# Patient Record
Sex: FEMALE | Race: WHITE | Employment: OTHER | URBAN - METROPOLITAN AREA
[De-identification: names, ages, dates, MRNs, and addresses within clinical notes are randomized per-mention and may not be internally consistent; named-entity substitution may affect disease eponyms.]

---

## 2017-01-09 ENCOUNTER — TELEPHONE (OUTPATIENT)
Dept: FAMILY MEDICINE CLINIC | Age: 57
End: 2017-01-09

## 2017-02-15 ENCOUNTER — OFFICE VISIT (OUTPATIENT)
Dept: ORTHOPEDIC SURGERY | Age: 57
End: 2017-02-15

## 2017-02-15 VITALS
DIASTOLIC BLOOD PRESSURE: 84 MMHG | BODY MASS INDEX: 30.64 KG/M2 | HEIGHT: 64 IN | HEART RATE: 73 BPM | WEIGHT: 179.45 LBS | SYSTOLIC BLOOD PRESSURE: 134 MMHG

## 2017-02-15 DIAGNOSIS — M67.472 GANGLION CYST OF LEFT FOOT: Primary | ICD-10-CM

## 2017-02-15 PROCEDURE — 99212 OFFICE O/P EST SF 10 MIN: CPT | Performed by: ORTHOPAEDIC SURGERY

## 2017-04-18 ENCOUNTER — OFFICE VISIT (OUTPATIENT)
Dept: FAMILY MEDICINE CLINIC | Age: 57
End: 2017-04-18

## 2017-04-18 VITALS
BODY MASS INDEX: 31.76 KG/M2 | HEIGHT: 64 IN | WEIGHT: 186 LBS | SYSTOLIC BLOOD PRESSURE: 110 MMHG | DIASTOLIC BLOOD PRESSURE: 64 MMHG

## 2017-04-18 DIAGNOSIS — Z01.818 PREOP EXAMINATION: ICD-10-CM

## 2017-04-18 DIAGNOSIS — M67.472 GANGLION CYST OF LEFT FOOT: Primary | ICD-10-CM

## 2017-04-18 LAB
ANION GAP SERPL CALCULATED.3IONS-SCNC: 13 MMOL/L (ref 3–16)
BUN BLDV-MCNC: 14 MG/DL (ref 7–20)
CALCIUM SERPL-MCNC: 8.8 MG/DL (ref 8.3–10.6)
CHLORIDE BLD-SCNC: 101 MMOL/L (ref 99–110)
CO2: 26 MMOL/L (ref 21–32)
CREAT SERPL-MCNC: 0.6 MG/DL (ref 0.6–1.1)
GFR AFRICAN AMERICAN: >60
GFR NON-AFRICAN AMERICAN: >60
GLUCOSE BLD-MCNC: 93 MG/DL (ref 70–99)
POTASSIUM SERPL-SCNC: 4.1 MMOL/L (ref 3.5–5.1)
SODIUM BLD-SCNC: 140 MMOL/L (ref 136–145)

## 2017-04-18 PROCEDURE — 36415 COLL VENOUS BLD VENIPUNCTURE: CPT | Performed by: PHYSICIAN ASSISTANT

## 2017-04-18 PROCEDURE — 99242 OFF/OP CONSLTJ NEW/EST SF 20: CPT | Performed by: PHYSICIAN ASSISTANT

## 2017-04-20 RX ORDER — SODIUM CHLORIDE, SODIUM LACTATE, POTASSIUM CHLORIDE, CALCIUM CHLORIDE 600; 310; 30; 20 MG/100ML; MG/100ML; MG/100ML; MG/100ML
INJECTION, SOLUTION INTRAVENOUS CONTINUOUS
Status: CANCELLED | OUTPATIENT
Start: 2017-04-20

## 2017-04-20 RX ORDER — LIDOCAINE HYDROCHLORIDE 10 MG/ML
1 INJECTION, SOLUTION EPIDURAL; INFILTRATION; INTRACAUDAL; PERINEURAL
Status: CANCELLED | OUTPATIENT
Start: 2017-04-20 | End: 2017-04-20

## 2017-04-23 RX ORDER — MEPERIDINE HYDROCHLORIDE 25 MG/ML
12.5 INJECTION INTRAMUSCULAR; INTRAVENOUS; SUBCUTANEOUS EVERY 5 MIN PRN
Status: DISCONTINUED | OUTPATIENT
Start: 2017-04-23 | End: 2017-04-25 | Stop reason: HOSPADM

## 2017-04-23 RX ORDER — OXYCODONE HYDROCHLORIDE AND ACETAMINOPHEN 5; 325 MG/1; MG/1
1 TABLET ORAL PRN
Status: ACTIVE | OUTPATIENT
Start: 2017-04-23 | End: 2017-04-23

## 2017-04-23 RX ORDER — PROMETHAZINE HYDROCHLORIDE 25 MG/ML
6.25 INJECTION, SOLUTION INTRAMUSCULAR; INTRAVENOUS
Status: ACTIVE | OUTPATIENT
Start: 2017-04-23 | End: 2017-04-23

## 2017-04-23 RX ORDER — DIPHENHYDRAMINE HYDROCHLORIDE 50 MG/ML
12.5 INJECTION INTRAMUSCULAR; INTRAVENOUS
Status: ACTIVE | OUTPATIENT
Start: 2017-04-23 | End: 2017-04-23

## 2017-04-23 RX ORDER — OXYCODONE HYDROCHLORIDE AND ACETAMINOPHEN 5; 325 MG/1; MG/1
2 TABLET ORAL PRN
Status: ACTIVE | OUTPATIENT
Start: 2017-04-23 | End: 2017-04-23

## 2017-04-23 RX ORDER — MORPHINE SULFATE 2 MG/ML
1 INJECTION, SOLUTION INTRAMUSCULAR; INTRAVENOUS EVERY 5 MIN PRN
Status: DISCONTINUED | OUTPATIENT
Start: 2017-04-23 | End: 2017-04-25 | Stop reason: HOSPADM

## 2017-04-23 RX ORDER — MORPHINE SULFATE 2 MG/ML
2 INJECTION, SOLUTION INTRAMUSCULAR; INTRAVENOUS EVERY 5 MIN PRN
Status: DISCONTINUED | OUTPATIENT
Start: 2017-04-23 | End: 2017-04-25 | Stop reason: HOSPADM

## 2017-04-23 RX ORDER — HYDRALAZINE HYDROCHLORIDE 20 MG/ML
5 INJECTION INTRAMUSCULAR; INTRAVENOUS EVERY 10 MIN PRN
Status: DISCONTINUED | OUTPATIENT
Start: 2017-04-23 | End: 2017-04-25 | Stop reason: HOSPADM

## 2017-04-23 RX ORDER — LABETALOL HYDROCHLORIDE 5 MG/ML
5 INJECTION, SOLUTION INTRAVENOUS EVERY 10 MIN PRN
Status: DISCONTINUED | OUTPATIENT
Start: 2017-04-23 | End: 2017-04-25 | Stop reason: HOSPADM

## 2017-04-23 RX ORDER — ONDANSETRON 2 MG/ML
4 INJECTION INTRAMUSCULAR; INTRAVENOUS
Status: ACTIVE | OUTPATIENT
Start: 2017-04-23 | End: 2017-04-23

## 2017-04-24 ENCOUNTER — HOSPITAL ENCOUNTER (OUTPATIENT)
Dept: SURGERY | Age: 57
Discharge: HOME | End: 2017-04-24
Attending: ORTHOPAEDIC SURGERY | Admitting: ORTHOPAEDIC SURGERY

## 2017-12-04 ENCOUNTER — OFFICE VISIT (OUTPATIENT)
Dept: INTERNAL MEDICINE CLINIC | Age: 57
End: 2017-12-04

## 2017-12-04 VITALS
HEIGHT: 65 IN | DIASTOLIC BLOOD PRESSURE: 86 MMHG | WEIGHT: 186 LBS | RESPIRATION RATE: 18 BRPM | HEART RATE: 82 BPM | BODY MASS INDEX: 30.99 KG/M2 | SYSTOLIC BLOOD PRESSURE: 128 MMHG

## 2017-12-04 DIAGNOSIS — Z00.00 ANNUAL PHYSICAL EXAM: Primary | ICD-10-CM

## 2017-12-04 PROCEDURE — 99396 PREV VISIT EST AGE 40-64: CPT | Performed by: INTERNAL MEDICINE

## 2017-12-04 NOTE — PROGRESS NOTES
[facility]  History and Physical  Alok Ivey M.D. Home Hall  YOB: 1960    Date of Service:  12/4/2017    Chief Complaint:   Home Hall is a 65 y.o. female who presents for   Chief Complaint   Patient presents with   [facility] Established New Doctor       HPI: Here for Annual Physical and Follow up.     Atrophic vaginitis: stable on estrogen/DHEA vaginal cream per hormone specialist.    No results found for: LABA1C, LABMICR  Lab Results   Component Value Date     04/18/2017    K 4.1 04/18/2017     04/18/2017    CO2 26 04/18/2017    BUN 14 04/18/2017    CREATININE 0.6 04/18/2017    GLUCOSE 93 04/18/2017    CALCIUM 8.8 04/18/2017     Lab Results   Component Value Date    CHOL 204 10/25/2016    TRIG 68 10/25/2016    HDL 69 10/25/2016    HDL 65 10/13/2011    LDLCALC 157 01/12/2016     Lab Results   Component Value Date    ALT 17 10/25/2016    AST 19 10/25/2016     Lab Results   Component Value Date    TSH 2.33 01/12/2016    TSH 3.25 10/05/2010     Lab Results   Component Value Date    WBC 5.0 10/25/2016    HGB 13.6 10/25/2016    HCT 40.8 10/25/2016    MCV 95.6 10/25/2016     10/25/2016     No results found for: INR   No results found for: PSA   No results found for: LABURIC     Wt Readings from Last 3 Encounters:   12/04/17 186 lb (84.4 kg)   11/02/17 187 lb 6.4 oz (85 kg)   04/21/17 186 lb (84.4 kg)     BP Readings from Last 3 Encounters:   12/04/17 128/86   11/02/17 122/86   04/18/17 110/64       Patient Active Problem List   Diagnosis    Ganglion cyst of left foot       No Known Allergies  Outpatient Prescriptions Marked as Taking for the 12/4/17 encounter (Office Visit) with Robert Gaffney MD   Medication Sig Dispense Refill    Emollient (DHEA) 1 % CREA Apply topically      Progesterone Micronized (PROGESTERONE PO) Take 200 mg by mouth      estradiol (ESTRACE) 0.1 MG/GM vaginal cream Place 5 g vaginally daily      NONFORMULARY DHEA cream 20 mg/ml Mouth/Throat: Oropharynx is clear and moist, and mucous membranes are normal.  There is no cervical adenopathy. Eyes: Conjunctivae and extraocular motions are normal. Pupils are equal, round, and reactive to light. Neck: Supple. No JVD present. Carotid bruit is not present. No mass and no thyromegaly present. Cardiovascular: Normal rate, regular rhythm, normal heart sounds and intact distal pulses. Exam reveals no gallop and no friction rub. No murmur heard. Pulmonary/Chest: Effort normal and breath sounds normal. No respiratory distress. She has no wheezes, rhonchi or rales. Abdominal: Soft, non-tender. Bowel sounds and aorta are normal. She exhibits no organomegaly, mass or bruit. Genitourinary: performed by gynecologist.  Breast exam:  performed by specialist.  Musculoskeletal: Normal range of motion, no synovitis. She exhibits no edema. Neurological: She is alert and oriented to person, place, and time. She has normal reflexes. No cranial nerve deficit. Coordination normal.   Skin: Skin is warm and dry. There is no rash or erythema. No suspicious lesions noted. Psychiatric: She has a normal mood and affect.  Her speech is normal and behavior is normal. Judgment, cognition and memory are normal.       No results found for: LABA1C, LABMICR  Lab Results   Component Value Date     04/18/2017    K 4.1 04/18/2017     04/18/2017    CO2 26 04/18/2017    BUN 14 04/18/2017    CREATININE 0.6 04/18/2017    GLUCOSE 93 04/18/2017    CALCIUM 8.8 04/18/2017     Lab Results   Component Value Date    CHOL 204 10/25/2016    TRIG 68 10/25/2016    HDL 69 10/25/2016    HDL 65 10/13/2011    LDLCALC 157 01/12/2016     Lab Results   Component Value Date    ALT 17 10/25/2016    AST 19 10/25/2016     Lab Results   Component Value Date    TSH 2.33 01/12/2016    TSH 3.25 10/05/2010     Lab Results   Component Value Date    WBC 5.0 10/25/2016    HGB 13.6 10/25/2016    HCT 40.8 10/25/2016    MCV 95.6 10/25/2016  10/25/2016     No results found for: INR   No results found for: PSA   No results found for: [facility]         Preventive Care:  Health Maintenance   Topic Date Due    Hepatitis C screen  1960    HIV screen  08/30/1976    Breast cancer screen  01/11/2018    Cervical cancer screen  10/01/2018    Colon cancer screen colonoscopy  04/07/2021    Lipid screen  10/25/2021    DTaP/Tdap/Td vaccine (2 - Td) 11/26/2022    Flu vaccine  Completed      Hx abnormal PAP: no  Sexual activity: has sex with males   Last eye exam: 2017, normal  Exercise: walks 4 time(s) per week   Seatbelt use: yes       Preventive plan of care for Kylie De Oliveira        12/4/2017           Preventive Measures Status       Recommendations for screening   Colon Cancer Screen   Colonoscopy Test is due 2021   Breast Cancer Screen  Mammogram Repeat yearly   Cervical Cancer Screen   PAP smear:  Test is due per GYN   Osteoporosis Screen   DEXA scan  This test is not clinically indicated   Diabetes Screen  Glucose (mg/dL)   Date Value   04/18/2017 93    Repeat yearly   Cholesterol Screen  Lab Results   Component Value Date    CHOL 204 (H) 10/25/2016    TRIG 68 10/25/2016    HDL 69 10/25/2016    LDLCALC 157 (H) 01/12/2016    Test recommended and ordered   Aspirin for Cardiovascular Prevention   No Not indicated   Weight: Body mass index is 31.43 kg/m².   5' 4.5\" (1.638 m)186 lb (84.4 kg)    Your BMI is 25 or greater, which indicates that you are overweight   Living Will: No   Full Code    Recommended Immunizations    Immunization History   Administered Date(s) Administered    Influenza Virus Vaccine 10/05/2010, 10/13/2011, 11/26/2012, 10/28/2015, 11/09/2017    Influenza, Intradermal, Preservative free 09/24/2013, 12/23/2014    Influenza, Madison Halon, 3 yrs and older, IM, Preservative Free 10/25/2016    Pneumococcal 13-valent Conjugate ([id number]) 10/28/2015    Tdap (Boostrix, Adacel) 11/26/2012        Influenza vaccine:  recommended every fall         Other Recommendations ·   See a dentist every 6 months  · Try to get at least 30 minutes of exercise 3-5 days per week  · Always wear a seat belt when traveling in a car  · Always wear a helmet when riding a bicycle or motorcycle  · When exposed to the sun, use a sunscreen that protects against both UVA and UVB radiation with an SPF of 30 or greater- reapply every 2 to 3 hours or after sweating, drying off with a towel, or swimming  · You need 3084-7485 mg of calcium and 2384-2799 IU of vitamin D per day- it is possible to meet your calcium requirement with diet alone, but a vitamin D supplement is usually necessary                 Assessment/Plan:    Art Rowe was seen today for established new doctor. Diagnoses and all orders for this visit:    Annual physical exam  -     Lipid Panel; Future  -     CBC Auto Differential; Future  -     COMPREHENSIVE METABOLIC PANEL; Future      Return Fasting Physical in 1 year.

## 2018-01-17 ENCOUNTER — HOSPITAL ENCOUNTER (OUTPATIENT)
Dept: GENERAL RADIOLOGY | Age: 58
Discharge: HOME | End: 2018-01-17
Attending: INTERNAL MEDICINE | Admitting: INTERNAL MEDICINE

## 2018-01-17 DIAGNOSIS — Z00.00 ANNUAL PHYSICAL EXAM: ICD-10-CM

## 2018-01-17 LAB
A/G RATIO: 1.7 (ref 1.1–2.2)
ALBUMIN SERPL-MCNC: 4.6 G/DL (ref 3.4–5)
ALP BLD-CCNC: 80 U/L (ref 40–129)
ALT SERPL-CCNC: 16 U/L (ref 10–40)
ANION GAP SERPL CALCULATED.3IONS-SCNC: 16 MMOL/L (ref 3–16)
AST SERPL-CCNC: 17 U/L (ref 15–37)
BASOPHILS ABSOLUTE: 0 K/UL (ref 0–0.2)
BASOPHILS RELATIVE PERCENT: 0.4 %
BILIRUB SERPL-MCNC: 0.7 MG/DL (ref 0–1)
BUN BLDV-MCNC: 16 MG/DL (ref 7–20)
CALCIUM SERPL-MCNC: 9.4 MG/DL (ref 8.3–10.6)
CHLORIDE BLD-SCNC: 104 MMOL/L (ref 99–110)
CHOLESTEROL, TOTAL: 230 MG/DL (ref 0–199)
CO2: 26 MMOL/L (ref 21–32)
CREAT SERPL-MCNC: 0.6 MG/DL (ref 0.6–1.1)
EOSINOPHILS ABSOLUTE: 0.1 K/UL (ref 0–0.6)
EOSINOPHILS RELATIVE PERCENT: 2.6 %
GFR AFRICAN AMERICAN: >60
GFR NON-AFRICAN AMERICAN: >60
GLOBULIN: 2.7 G/DL
GLUCOSE BLD-MCNC: 82 MG/DL (ref 70–99)
HCT VFR BLD CALC: 40.7 % (ref 36–48)
HDLC SERPL-MCNC: 68 MG/DL (ref 40–60)
HEMOGLOBIN: 13.7 G/DL (ref 12–16)
LDL CHOLESTEROL CALCULATED: 143 MG/DL
LYMPHOCYTES ABSOLUTE: 1.7 K/UL (ref 1–5.1)
LYMPHOCYTES RELATIVE PERCENT: 31.6 %
MCH RBC QN AUTO: 31.9 PG (ref 26–34)
MCHC RBC AUTO-ENTMCNC: 33.6 G/DL (ref 31–36)
MCV RBC AUTO: 94.8 FL (ref 80–100)
MONOCYTES ABSOLUTE: 0.4 K/UL (ref 0–1.3)
MONOCYTES RELATIVE PERCENT: 7.7 %
NEUTROPHILS ABSOLUTE: 3.1 K/UL (ref 1.7–7.7)
NEUTROPHILS RELATIVE PERCENT: 57.7 %
PDW BLD-RTO: 13.5 % (ref 12.4–15.4)
PLATELET # BLD: 257 K/UL (ref 135–450)
PMV BLD AUTO: 9.6 FL (ref 5–10.5)
POTASSIUM SERPL-SCNC: 4.2 MMOL/L (ref 3.5–5.1)
RBC # BLD: 4.3 M/UL (ref 4–5.2)
SODIUM BLD-SCNC: 146 MMOL/L (ref 136–145)
TOTAL PROTEIN: 7.3 G/DL (ref 6.4–8.2)
TRIGL SERPL-MCNC: 94 MG/DL (ref 0–150)
VLDLC SERPL CALC-MCNC: 19 MG/DL
WBC # BLD: 5.5 K/UL (ref 4–11)

## 2018-01-18 ENCOUNTER — TELEPHONE (OUTPATIENT)
Dept: INTERNAL MEDICINE CLINIC | Age: 58
End: 2018-01-18

## 2018-01-18 NOTE — TELEPHONE ENCOUNTER
Patient called, states her previous PCP Dr. Maurisio High wrote a letter for her last year on 1/9/17 stating that it would be beneficial for her to get massages for medical reasons due to her chronic intermittent trapezius strain and she wanted to know if  would also be willing to do this for her as she is her new PCP by writing her a new letter for this year?  Please advise [phone]

## 2018-02-23 ENCOUNTER — OFFICE VISIT (OUTPATIENT)
Dept: ORTHOPEDIC SURGERY | Age: 58
End: 2018-02-23

## 2018-02-23 DIAGNOSIS — M67.472 GANGLION CYST OF LEFT FOOT: Primary | ICD-10-CM

## 2018-02-23 PROCEDURE — 99212 OFFICE O/P EST SF 10 MIN: CPT | Performed by: ORTHOPAEDIC SURGERY

## 2018-02-26 ENCOUNTER — TELEPHONE (OUTPATIENT)
Dept: ORTHOPEDIC SURGERY | Age: 58
End: 2018-02-26

## 2018-02-26 NOTE — TELEPHONE ENCOUNTER
Auth: NPR  Date: 3/1/18  Reference # None  Type of SX: Outpatient  CPT [id number], [id number]   Location: [facility]  SX area: [facility]

## 2018-02-28 ENCOUNTER — OFFICE VISIT (OUTPATIENT)
Dept: INTERNAL MEDICINE CLINIC | Age: 58
End: 2018-02-28

## 2018-02-28 VITALS
BODY MASS INDEX: 31.76 KG/M2 | WEIGHT: 186 LBS | OXYGEN SATURATION: 98 % | HEART RATE: 74 BPM | TEMPERATURE: 98 F | SYSTOLIC BLOOD PRESSURE: 112 MMHG | DIASTOLIC BLOOD PRESSURE: 78 MMHG | RESPIRATION RATE: 16 BRPM | HEIGHT: 64 IN

## 2018-02-28 DIAGNOSIS — M67.472 GANGLION CYST OF LEFT FOOT: ICD-10-CM

## 2018-02-28 DIAGNOSIS — Z01.818 PREOP EXAM FOR INTERNAL MEDICINE: Primary | ICD-10-CM

## 2018-02-28 LAB
ALBUMIN SERPL-MCNC: 4.7 G/DL (ref 3.4–5)
ANION GAP SERPL CALCULATED.3IONS-SCNC: 15 MMOL/L (ref 3–16)
BUN BLDV-MCNC: 14 MG/DL (ref 7–20)
CALCIUM SERPL-MCNC: 9.7 MG/DL (ref 8.3–10.6)
CHLORIDE BLD-SCNC: 98 MMOL/L (ref 99–110)
CO2: 27 MMOL/L (ref 21–32)
CREAT SERPL-MCNC: 0.6 MG/DL (ref 0.6–1.1)
GFR AFRICAN AMERICAN: >60
GFR NON-AFRICAN AMERICAN: >60
GLUCOSE BLD-MCNC: 168 MG/DL (ref 70–99)
PHOSPHORUS: 3.8 MG/DL (ref 2.5–4.9)
POTASSIUM SERPL-SCNC: 3.8 MMOL/L (ref 3.5–5.1)
SODIUM BLD-SCNC: 140 MMOL/L (ref 136–145)

## 2018-02-28 PROCEDURE — 99243 OFF/OP CNSLTJ NEW/EST LOW 30: CPT | Performed by: INTERNAL MEDICINE

## 2018-02-28 PROCEDURE — 36415 COLL VENOUS BLD VENIPUNCTURE: CPT | Performed by: INTERNAL MEDICINE

## 2018-02-28 NOTE — PROGRESS NOTES
PREOPERATIVE CONSULTATION      Pauline Hull  YOB: 1960    Date of Service:  2/28/2018    Vitals:    02/28/18 1344   BP: 112/78   Site: Left Arm   Position: Sitting   Cuff Size: Medium Adult   Pulse: 74   Resp: 16   Temp: 98 °F (36.7 °C)   SpO2: 98%   Weight: 186 lb (84.4 kg)   Height: 5' 4\" (1.626 m)      Wt Readings from Last 2 Encounters:   02/28/18 186 lb (84.4 kg)   02/28/18 186 lb (84.4 kg)     BP Readings from Last 3 Encounters:   02/28/18 112/78   12/04/17 128/86   11/02/17 122/86        Chief Complaint   Patient presents with   Umm Barcenas Pre-op Exam     No Known Allergies  Outpatient Prescriptions Marked as Taking for the 2/28/18 encounter (Office Visit) with Hawa Gaffney MD   Medication Sig Dispense Refill    Emollient (DHEA) 1 % CREA Apply topically      Progesterone Micronized (PROGESTERONE PO) Take 200 mg by mouth      estradiol (ESTRACE) 0.1 MG/GM vaginal cream Place 5 g vaginally daily      NONFORMULARY DHEA cream 20 mg/ml      Cholecalciferol (VITAMIN D3) 2000 UNITS CAPS Take by mouth      Multiple Vitamin (MULTIVITAMIN PO) Take  by mouth. This patient presents to the office today for a preoperative consultation at the request of surgeon, Dr. Donell Thomas, who plans on performing Left foot Ganglion Cyst removal on March 1 at [facility].  The current problem began 2 years ago, and symptoms have been worsening with time.   Conservative therapy: No.    Planned anesthesia: General  Known anesthesia problems: None   Bleeding risk: No recent or remote history of abnormal bleeding  Personal or FH of DVT/PE: No    Patient objection to receiving blood products: No    Patient Active Problem List   Diagnosis    Ganglion cyst of left foot       Past Medical History:   Diagnosis Date    Atrophic vaginitis      Past Surgical History:   Procedure Laterality Date    COLONOSCOPY  4/6/16    polyps     Family History   Problem Relation Age of Onset    Alzheimer's Disease are normal. She exhibits no distension and no mass. There is no hepatosplenomegaly. No tenderness. Musculoskeletal: She exhibits no edema and no tenderness. Neurological: She is alert and oriented to person, place, and time. She has normal strength. No cranial nerve deficit or sensory deficit. Coordination and gait normal.   Skin: Skin is warm and dry. No rash noted. No erythema. Psychiatric: She has a normal mood and affect. Her behavior is normal.       No results found for: LABA1C, LABMICR  Lab Results   Component Value Date     (H) 01/17/2018    K 4.2 01/17/2018     01/17/2018    CO2 26 01/17/2018    BUN 16 01/17/2018    CREATININE 0.6 01/17/2018    GLUCOSE 82 01/17/2018    CALCIUM 9.4 01/17/2018     Lab Results   Component Value Date    CHOL 230 01/17/2018    TRIG 94 01/17/2018    HDL 68 01/17/2018    HDL 65 10/13/2011    LDLCALC 143 01/17/2018     Lab Results   Component Value Date    ALT 16 01/17/2018    AST 17 01/17/2018     Lab Results   Component Value Date    TSH 2.33 01/12/2016    TSH 3.25 10/05/2010     Lab Results   Component Value Date    WBC 5.5 01/17/2018    HGB 13.7 01/17/2018    HCT 40.7 01/17/2018    MCV 94.8 01/17/2018     01/17/2018     No results found for: INR   No results found for: PSA   No results found for: LABURIC          Assessment:       65 y.o. patient with planned surgery as above. SURGERY SPECIFIC RISK:  none  Known risk factors for perioperative complications: None  Current medications which may produce withdrawal symptoms if withheld perioperatively: none      Plan:     1. Preoperative workup as follows: none  2. Change in medication regimen before surgery: None  3.  Prophylaxis for cardiac events with perioperative beta-blockers: Not indicated  ACC/AHA indications for pre-operative beta-blocker use:    · Vascular surgery with history of postitive stress test  · Intermediate or high risk surgery with history of CAD   · Intermediate or high risk surgery

## 2018-03-01 ENCOUNTER — HOSPITAL ENCOUNTER (OUTPATIENT)
Dept: SURGERY | Age: 58
Discharge: HOME | End: 2018-03-01
Attending: ORTHOPAEDIC SURGERY | Admitting: ORTHOPAEDIC SURGERY

## 2018-03-01 VITALS
HEIGHT: 64 IN | TEMPERATURE: 97.4 F | RESPIRATION RATE: 16 BRPM | BODY MASS INDEX: 31.76 KG/M2 | SYSTOLIC BLOOD PRESSURE: 139 MMHG | HEART RATE: 79 BPM | WEIGHT: 186 LBS | OXYGEN SATURATION: 98 % | DIASTOLIC BLOOD PRESSURE: 97 MMHG

## 2018-03-01 DIAGNOSIS — M67.472 GANGLION CYST OF LEFT FOOT: ICD-10-CM

## 2018-03-01 DIAGNOSIS — M21.612 BUNION, LEFT: Primary | ICD-10-CM

## 2018-03-01 RX ORDER — LIDOCAINE HYDROCHLORIDE 10 MG/ML
2 INJECTION, SOLUTION INFILTRATION; PERINEURAL
Status: ACTIVE | OUTPATIENT
Start: 2018-03-01 | End: 2018-03-01

## 2018-03-01 RX ORDER — DIPHENHYDRAMINE HYDROCHLORIDE 50 MG/ML
12.5 INJECTION INTRAMUSCULAR; INTRAVENOUS
Status: ACTIVE | OUTPATIENT
Start: 2018-03-01 | End: 2018-03-01

## 2018-03-01 RX ORDER — MORPHINE SULFATE 2 MG/ML
1 INJECTION, SOLUTION INTRAMUSCULAR; INTRAVENOUS EVERY 5 MIN PRN
Status: DISCONTINUED | OUTPATIENT
Start: 2018-03-01 | End: 2018-03-02 | Stop reason: HOSPADM

## 2018-03-01 RX ORDER — SODIUM CHLORIDE, SODIUM LACTATE, POTASSIUM CHLORIDE, CALCIUM CHLORIDE 600; 310; 30; 20 MG/100ML; MG/100ML; MG/100ML; MG/100ML
INJECTION, SOLUTION INTRAVENOUS CONTINUOUS
Status: DISCONTINUED | OUTPATIENT
Start: 2018-03-01 | End: 2018-03-02 | Stop reason: HOSPADM

## 2018-03-01 RX ORDER — PROMETHAZINE HYDROCHLORIDE 25 MG/ML
6.25 INJECTION, SOLUTION INTRAMUSCULAR; INTRAVENOUS
Status: ACTIVE | OUTPATIENT
Start: 2018-03-01 | End: 2018-03-01

## 2018-03-01 RX ORDER — CEPHALEXIN 500 MG/1
500 CAPSULE ORAL 4 TIMES DAILY
Qty: 8 CAPSULE | Refills: 0 | Status: SHIPPED | OUTPATIENT
Start: 2018-03-01 | End: 2018-03-03

## 2018-03-01 RX ORDER — HYDRALAZINE HYDROCHLORIDE 20 MG/ML
5 INJECTION INTRAMUSCULAR; INTRAVENOUS EVERY 10 MIN PRN
Status: DISCONTINUED | OUTPATIENT
Start: 2018-03-01 | End: 2018-03-02 | Stop reason: HOSPADM

## 2018-03-01 RX ORDER — ONDANSETRON 2 MG/ML
4 INJECTION INTRAMUSCULAR; INTRAVENOUS
Status: ACTIVE | OUTPATIENT
Start: 2018-03-01 | End: 2018-03-01

## 2018-03-01 RX ORDER — MORPHINE SULFATE 2 MG/ML
2 INJECTION, SOLUTION INTRAMUSCULAR; INTRAVENOUS EVERY 5 MIN PRN
Status: DISCONTINUED | OUTPATIENT
Start: 2018-03-01 | End: 2018-03-02 | Stop reason: HOSPADM

## 2018-03-01 RX ORDER — OXYCODONE HYDROCHLORIDE AND ACETAMINOPHEN 5; 325 MG/1; MG/1
2 TABLET ORAL PRN
Status: ACTIVE | OUTPATIENT
Start: 2018-03-01 | End: 2018-03-01

## 2018-03-01 RX ORDER — HYDROCODONE BITARTRATE AND ACETAMINOPHEN 5; 325 MG/1; MG/1
1 TABLET ORAL EVERY 8 HOURS PRN
Qty: 21 TABLET | Refills: 0 | Status: SHIPPED | OUTPATIENT
Start: 2018-03-01 | End: 2018-03-08

## 2018-03-01 RX ORDER — LABETALOL HYDROCHLORIDE 5 MG/ML
5 INJECTION, SOLUTION INTRAVENOUS EVERY 10 MIN PRN
Status: DISCONTINUED | OUTPATIENT
Start: 2018-03-01 | End: 2018-03-02 | Stop reason: HOSPADM

## 2018-03-01 RX ORDER — MEPERIDINE HYDROCHLORIDE 50 MG/ML
12.5 INJECTION INTRAMUSCULAR; INTRAVENOUS; SUBCUTANEOUS EVERY 5 MIN PRN
Status: DISCONTINUED | OUTPATIENT
Start: 2018-03-01 | End: 2018-03-02 | Stop reason: HOSPADM

## 2018-03-01 RX ORDER — OXYCODONE HYDROCHLORIDE AND ACETAMINOPHEN 5; 325 MG/1; MG/1
1 TABLET ORAL PRN
Status: ACTIVE | OUTPATIENT
Start: 2018-03-01 | End: 2018-03-01

## 2018-03-01 RX ADMIN — Medication 0.25 MG: at 15:27

## 2018-03-01 RX ADMIN — Medication 0.25 MG: at 15:15

## 2018-03-01 RX ADMIN — SODIUM CHLORIDE, SODIUM LACTATE, POTASSIUM CHLORIDE, CALCIUM CHLORIDE: 600; 310; 30; 20 INJECTION, SOLUTION INTRAVENOUS at 12:35

## 2018-03-01 ASSESSMENT — PAIN SCALES - GENERAL
PAINLEVEL_OUTOF10: 5
PAINLEVEL_OUTOF10: 5
PAINLEVEL_OUTOF10: 0
PAINLEVEL_OUTOF10: 4
PAINLEVEL_OUTOF10: 4

## 2018-03-01 ASSESSMENT — PAIN - FUNCTIONAL ASSESSMENT: PAIN_FUNCTIONAL_ASSESSMENT: 0-10

## 2018-03-01 NOTE — ANESTHESIA PRE-OP
Surgical History:        Procedure Laterality Date    COLONOSCOPY  4/6/16    polyps       Social History:    Social History   Substance Use Topics    Smoking status: Never Smoker    Smokeless tobacco: Never Used    Alcohol use 1.2 oz/week     2 Glasses of wine per week                                Counseling given: Not Answered      Vital Signs (Current):   Vitals:    03/01/18 1200   BP: 115/85   Pulse: 84   Resp: 16   Temp: 97.6 °F (36.4 °C)   TempSrc: Temporal   SpO2: 94%   Weight: 186 lb (84.4 kg)   Height: 5' 4\" (1.626 m)                                              BP Readings from Last 3 Encounters:   03/01/18 115/85   02/28/18 112/78   12/04/17 128/86       NPO Status: Time of last liquid consumption: 2200                        Time of last solid consumption: 2010                        Date of last liquid consumption: 02/28/18                        Date of last solid food consumption: 02/28/18    BMI:   Wt Readings from Last 3 Encounters:   03/01/18 186 lb (84.4 kg)   02/28/18 186 lb (84.4 kg)   02/28/18 186 lb (84.4 kg)     Body mass index is 31.93 kg/m². Anesthesia Evaluation   no history of anesthetic complications:   Airway: Mallampati: II  TM distance: >3 FB   Neck ROM: full  Mouth opening: > = 3 FB Dental: normal exam         Pulmonary:Negative Pulmonary ROS                              Cardiovascular:Negative CV ROS                      Neuro/Psych:   Negative Neuro/Psych ROS              GI/Hepatic/Renal: Neg GI/Hepatic/Renal ROS            Endo/Other: Negative Endo/Other ROS                    Abdominal:           Vascular: negative vascular ROS. Anesthesia Plan      general     ASA 1     (Risks, benefits and alternatives of GA discussed with pt. Questions answered. Willing to proceed.)  Induction: intravenous. Anesthetic plan and risks discussed with patient.                       Martin Shea MD   3/1/2018

## 2018-03-09 ENCOUNTER — OFFICE VISIT (OUTPATIENT)
Dept: ORTHOPEDIC SURGERY | Age: 58
End: 2018-03-09

## 2018-03-09 VITALS
WEIGHT: 186.07 LBS | HEART RATE: 86 BPM | HEIGHT: 64 IN | DIASTOLIC BLOOD PRESSURE: 87 MMHG | SYSTOLIC BLOOD PRESSURE: 128 MMHG | BODY MASS INDEX: 31.77 KG/M2

## 2018-03-09 DIAGNOSIS — M21.612 BUNION, LEFT: ICD-10-CM

## 2018-03-09 DIAGNOSIS — M67.472 GANGLION CYST OF LEFT FOOT: Primary | ICD-10-CM

## 2018-03-09 PROCEDURE — 99024 POSTOP FOLLOW-UP VISIT: CPT | Performed by: ORTHOPAEDIC SURGERY

## 2018-03-09 PROCEDURE — L3100 HALLUS-VALGUS NT DYN PRE OTS: HCPCS | Performed by: ORTHOPAEDIC SURGERY

## 2018-03-09 NOTE — PROGRESS NOTES
Subjective: Patient states that she is here for follow-up of her 3/1/18 left foot ganglion cyst excision and bunion correction. She states that her pain is minimal not taking pain medicine  Objective: Physical exam shows incision evidence of infection sensations intact alignment looks good full range of motion at the ankle  Imaging: 3 views of the left foot show the silver ostectomy unchanged  Assessment and plan:  This patient's doing well we covered her incision was Xeroform dressing sponges and Tegaderm she is going to [place] for the next week I instructed her in appropriate washing and dressing of the area she'll follow-up with me in 10 days for removal of her sutures we also gave her a bunion splint

## 2018-03-20 ENCOUNTER — OFFICE VISIT (OUTPATIENT)
Dept: ORTHOPEDIC SURGERY | Age: 58
End: 2018-03-20

## 2018-03-20 VITALS
WEIGHT: 186.07 LBS | DIASTOLIC BLOOD PRESSURE: 87 MMHG | HEIGHT: 64 IN | BODY MASS INDEX: 31.77 KG/M2 | HEART RATE: 105 BPM | SYSTOLIC BLOOD PRESSURE: 116 MMHG

## 2018-03-20 DIAGNOSIS — M21.612 BUNION, LEFT: ICD-10-CM

## 2018-03-20 DIAGNOSIS — M67.472 GANGLION CYST OF LEFT FOOT: Primary | ICD-10-CM

## 2018-03-20 PROCEDURE — 99024 POSTOP FOLLOW-UP VISIT: CPT | Performed by: ORTHOPAEDIC SURGERY

## 2018-05-01 ENCOUNTER — OFFICE VISIT (OUTPATIENT)
Dept: ORTHOPEDIC SURGERY | Age: 58
End: 2018-05-01

## 2018-05-01 VITALS
HEART RATE: 87 BPM | BODY MASS INDEX: 31.77 KG/M2 | DIASTOLIC BLOOD PRESSURE: 71 MMHG | HEIGHT: 64 IN | WEIGHT: 186.07 LBS | SYSTOLIC BLOOD PRESSURE: 109 MMHG

## 2018-05-01 DIAGNOSIS — M21.612 BUNION, LEFT: Primary | ICD-10-CM

## 2018-05-01 PROCEDURE — 99024 POSTOP FOLLOW-UP VISIT: CPT | Performed by: ORTHOPAEDIC SURGERY

## 2018-07-20 ENCOUNTER — OFFICE VISIT (OUTPATIENT)
Dept: ORTHOPEDIC SURGERY | Age: 58
End: 2018-07-20

## 2018-07-20 DIAGNOSIS — M79.672 FOOT PAIN, LEFT: Primary | ICD-10-CM

## 2018-07-20 DIAGNOSIS — M21.612 BUNION, LEFT: ICD-10-CM

## 2018-07-20 PROCEDURE — 99212 OFFICE O/P EST SF 10 MIN: CPT | Performed by: ORTHOPAEDIC SURGERY

## 2018-07-20 NOTE — PROGRESS NOTES
Subjective: Patient states that she is here for follow-up dorsal medial ganglion cyst excision silver osteotomy 3/1/18. She states that her left foot has been somewhat tender over the incision itself and she's noticed some drifting of the great toe underneath the 2nd toe. Objective: Physical exam showsIncisions are well-healed no evidence of infection sensations intact she has tenderness over the dorsal medial aspect of the 1st MTP joint. 40-50° of 1st MTP extension 20° of flexion she does have slight elevation of the 2nd toe. Minimal tenderness to palpation. Imaging:3 views of the left foot show previous silver ostectomy there is some valgus deviation of the great toe impinging on the 2nd toe   Assessment and plan I think she is getting some valgus deformity related to the silver ostectomy. Her ganglion cyst has not recurred.   I'll see her back in 6 weeks she is of the foot we did talk about doing a taken proximal phalanx osteotomy:

## 2018-11-05 ENCOUNTER — NURSE ONLY (OUTPATIENT)
Dept: INTERNAL MEDICINE CLINIC | Age: 58
End: 2018-11-05
Payer: COMMERCIAL

## 2018-11-05 DIAGNOSIS — Z23 NEED FOR INFLUENZA VACCINATION: Primary | ICD-10-CM

## 2018-11-05 PROCEDURE — 90471 IMMUNIZATION ADMIN: CPT | Performed by: INTERNAL MEDICINE

## 2018-11-05 PROCEDURE — 90686 IIV4 VACC NO PRSV 0.5 ML IM: CPT | Performed by: INTERNAL MEDICINE

## 2018-12-04 ENCOUNTER — OFFICE VISIT (OUTPATIENT)
Dept: INTERNAL MEDICINE CLINIC | Age: 58
End: 2018-12-04
Payer: COMMERCIAL

## 2018-12-04 VITALS
BODY MASS INDEX: 32.95 KG/M2 | HEART RATE: 76 BPM | OXYGEN SATURATION: 99 % | HEIGHT: 64 IN | DIASTOLIC BLOOD PRESSURE: 82 MMHG | WEIGHT: 193 LBS | SYSTOLIC BLOOD PRESSURE: 130 MMHG

## 2018-12-04 DIAGNOSIS — L30.9 DERMATITIS: ICD-10-CM

## 2018-12-04 DIAGNOSIS — Z00.00 ANNUAL PHYSICAL EXAM: Primary | ICD-10-CM

## 2018-12-04 DIAGNOSIS — K21.9 GASTROESOPHAGEAL REFLUX DISEASE WITHOUT ESOPHAGITIS: ICD-10-CM

## 2018-12-04 PROCEDURE — 99396 PREV VISIT EST AGE 40-64: CPT | Performed by: INTERNAL MEDICINE

## 2018-12-04 RX ORDER — TRIAMCINOLONE ACETONIDE 5 MG/G
CREAM TOPICAL
Qty: 15 G | Refills: 0 | Status: SHIPPED | OUTPATIENT
Start: 2018-12-04 | End: 2018-12-11

## 2018-12-04 ASSESSMENT — PATIENT HEALTH QUESTIONNAIRE - PHQ9
SUM OF ALL RESPONSES TO PHQ9 QUESTIONS 1 & 2: 0
SUM OF ALL RESPONSES TO PHQ QUESTIONS 1-9: 0
1. LITTLE INTEREST OR PLEASURE IN DOING THINGS: 0
SUM OF ALL RESPONSES TO PHQ QUESTIONS 1-9: 0
2. FEELING DOWN, DEPRESSED OR HOPELESS: 0

## 2018-12-04 NOTE — PATIENT INSTRUCTIONS
Preventive plan of care for Cem Hebert        12/4/2018           Preventive Measures Status       Recommendations for screening   Colon Cancer Screen   Colonoscopy 2016 Test is due 2021   Breast Cancer Screen  Mammogram Repeat yearly   Cervical Cancer Screen   PAP smear:  Test is due per GYN   Osteoporosis Screen   DEXA scan  This test is not clinically indicated   Diabetes Screen  Glucose (mg/dL)   Date Value   02/28/2018 168 (H)    Test recommended and ordered   Cholesterol Screen  Lab Results   Component Value Date    CHOL 230 (H) 01/17/2018    TRIG 94 01/17/2018    HDL 68 (H) 01/17/2018    LDLCALC 143 (H) 01/17/2018    Test recommended and ordered   Aspirin for Cardiovascular Prevention   No Not indicated   Weight: Body mass index is 33.13 kg/m².   5' 4\" (1.626 m)193 lb (87.5 kg)    Your BMI is 25 or greater, which indicates that you are overweight   Living Will: No   Full Code    Recommended Immunizations    Immunization History   Administered Date(s) Administered    Influenza Virus Vaccine 10/05/2010, 10/13/2011, 11/26/2012, 10/28/2015, 11/09/2017    Influenza, Intradermal, Preservative free 09/24/2013, 12/23/2014    Influenza, Dolores Cortés, 3 yrs and older, IM, PF (Fluzone 3 yrs and older or Afluria 5 yrs and older) 10/25/2016, 11/05/2018    Pneumococcal 13-valent Conjugate ([id number]) 10/28/2015    Tdap (Boostrix, Adacel) 11/26/2012        Influenza vaccine:  recommended every fall  Shingles vaccine:  Call insurance regarding coverage for the vaccine           Other Recommendations ·   See a dentist every 6 months  · Try to get at least 30 minutes of exercise 3-5 days per week  · Always wear a seat belt when traveling in a car  · Always wear a helmet when riding a bicycle or motorcycle  · When exposed to the sun, use a sunscreen that protects against both UVA and UVB radiation with an SPF of 30 or greater- reapply every 2 to 3 hours or after sweating, drying off with a towel, or swimming  · You

## 2019-01-28 ENCOUNTER — HOSPITAL ENCOUNTER (OUTPATIENT)
Age: 59
Discharge: HOME | End: 2019-01-28
Payer: COMMERCIAL

## 2019-01-28 DIAGNOSIS — Z00.00 ANNUAL PHYSICAL EXAM: ICD-10-CM

## 2019-01-28 LAB
A/G RATIO: 1.6 (ref 1.1–2.2)
ALBUMIN SERPL-MCNC: 4.6 G/DL (ref 3.4–5)
ALP BLD-CCNC: 90 U/L (ref 40–129)
ALT SERPL-CCNC: 12 U/L (ref 10–40)
ANION GAP SERPL CALCULATED.3IONS-SCNC: 12 MMOL/L (ref 3–16)
AST SERPL-CCNC: 14 U/L (ref 15–37)
BASOPHILS ABSOLUTE: 0.1 K/UL (ref 0–0.2)
BASOPHILS RELATIVE PERCENT: 1.2 %
BILIRUB SERPL-MCNC: 0.3 MG/DL (ref 0–1)
BUN BLDV-MCNC: 14 MG/DL (ref 7–20)
CALCIUM SERPL-MCNC: 9.2 MG/DL (ref 8.3–10.6)
CHLORIDE BLD-SCNC: 105 MMOL/L (ref 99–110)
CHOLESTEROL, TOTAL: 220 MG/DL (ref 0–199)
CO2: 26 MMOL/L (ref 21–32)
CREAT SERPL-MCNC: 0.7 MG/DL (ref 0.6–1.1)
EOSINOPHILS ABSOLUTE: 0.2 K/UL (ref 0–0.6)
EOSINOPHILS RELATIVE PERCENT: 3.2 %
GFR AFRICAN AMERICAN: >60
GFR NON-AFRICAN AMERICAN: >60
GLOBULIN: 2.9 G/DL
GLUCOSE BLD-MCNC: 97 MG/DL (ref 70–99)
HCT VFR BLD CALC: 40.8 % (ref 36–48)
HDLC SERPL-MCNC: 61 MG/DL (ref 40–60)
HEMOGLOBIN: 13.5 G/DL (ref 12–16)
HEPATITIS C ANTIBODY INTERPRETATION: NORMAL
HIV AG/AB: NORMAL
HIV ANTIGEN: NORMAL
HIV-1 ANTIBODY: NORMAL
HIV-2 AB: NORMAL
LDL CHOLESTEROL CALCULATED: 132 MG/DL
LYMPHOCYTES ABSOLUTE: 1.5 K/UL (ref 1–5.1)
LYMPHOCYTES RELATIVE PERCENT: 28 %
MCH RBC QN AUTO: 31.3 PG (ref 26–34)
MCHC RBC AUTO-ENTMCNC: 33.2 G/DL (ref 31–36)
MCV RBC AUTO: 94.3 FL (ref 80–100)
MONOCYTES ABSOLUTE: 0.5 K/UL (ref 0–1.3)
MONOCYTES RELATIVE PERCENT: 9.8 %
NEUTROPHILS ABSOLUTE: 3 K/UL (ref 1.7–7.7)
NEUTROPHILS RELATIVE PERCENT: 57.8 %
PDW BLD-RTO: 13.2 % (ref 12.4–15.4)
PLATELET # BLD: 282 K/UL (ref 135–450)
PMV BLD AUTO: 9.3 FL (ref 5–10.5)
POTASSIUM SERPL-SCNC: 4.2 MMOL/L (ref 3.5–5.1)
RBC # BLD: 4.33 M/UL (ref 4–5.2)
SODIUM BLD-SCNC: 143 MMOL/L (ref 136–145)
TOTAL PROTEIN: 7.5 G/DL (ref 6.4–8.2)
TRIGL SERPL-MCNC: 134 MG/DL (ref 0–150)
VLDLC SERPL CALC-MCNC: 27 MG/DL
WBC # BLD: 5.2 K/UL (ref 4–11)

## 2019-01-28 PROCEDURE — 36415 COLL VENOUS BLD VENIPUNCTURE: CPT

## 2019-01-28 PROCEDURE — 80061 LIPID PANEL: CPT

## 2019-01-28 PROCEDURE — 87390 HIV-1 AG IA: CPT

## 2019-01-28 PROCEDURE — 86803 HEPATITIS C AB TEST: CPT

## 2019-01-28 PROCEDURE — 80053 COMPREHEN METABOLIC PANEL: CPT

## 2019-01-28 PROCEDURE — 86701 HIV-1ANTIBODY: CPT

## 2019-01-28 PROCEDURE — 86702 HIV-2 ANTIBODY: CPT

## 2019-01-28 PROCEDURE — 85025 COMPLETE CBC W/AUTO DIFF WBC: CPT

## 2019-09-12 ENCOUNTER — OFFICE VISIT (OUTPATIENT)
Dept: INTERNAL MEDICINE CLINIC | Age: 59
End: 2019-09-12
Payer: COMMERCIAL

## 2019-09-12 VITALS
BODY MASS INDEX: 32.61 KG/M2 | HEART RATE: 80 BPM | DIASTOLIC BLOOD PRESSURE: 72 MMHG | HEIGHT: 64 IN | SYSTOLIC BLOOD PRESSURE: 122 MMHG | WEIGHT: 191 LBS | OXYGEN SATURATION: 95 %

## 2019-09-12 DIAGNOSIS — Z23 NEED FOR ZOSTER VACCINATION: ICD-10-CM

## 2019-09-12 DIAGNOSIS — R07.89 ATYPICAL CHEST PAIN: Primary | ICD-10-CM

## 2019-09-12 DIAGNOSIS — J45.20 MILD INTERMITTENT REACTIVE AIRWAY DISEASE WITHOUT COMPLICATION: ICD-10-CM

## 2019-09-12 PROCEDURE — 93000 ELECTROCARDIOGRAM COMPLETE: CPT | Performed by: INTERNAL MEDICINE

## 2019-09-12 PROCEDURE — 90471 IMMUNIZATION ADMIN: CPT | Performed by: INTERNAL MEDICINE

## 2019-09-12 PROCEDURE — 99213 OFFICE O/P EST LOW 20 MIN: CPT | Performed by: INTERNAL MEDICINE

## 2019-09-12 PROCEDURE — 90750 HZV VACC RECOMBINANT IM: CPT | Performed by: INTERNAL MEDICINE

## 2019-09-12 RX ORDER — ALBUTEROL SULFATE 90 UG/1
2 AEROSOL, METERED RESPIRATORY (INHALATION) 4 TIMES DAILY PRN
Qty: 1 INHALER | Refills: 0 | Status: SHIPPED | OUTPATIENT
Start: 2019-09-12 | End: 2019-09-29 | Stop reason: SDUPTHER

## 2019-09-12 ASSESSMENT — PATIENT HEALTH QUESTIONNAIRE - PHQ9
2. FEELING DOWN, DEPRESSED OR HOPELESS: 0
SUM OF ALL RESPONSES TO PHQ QUESTIONS 1-9: 0
SUM OF ALL RESPONSES TO PHQ9 QUESTIONS 1 & 2: 0
1. LITTLE INTEREST OR PLEASURE IN DOING THINGS: 0
SUM OF ALL RESPONSES TO PHQ QUESTIONS 1-9: 0

## 2019-09-12 NOTE — PROGRESS NOTES
Dermatitis    Gastroesophageal reflux disease without esophagitis       No Known Allergies  No outpatient medications have been marked as taking for the 9/12/19 encounter (Office Visit) with Essence Mistry MD.         Review of Systems: 14 systems were negative except of what was stated on HPI    Nursing note and vitals reviewed. Vitals:    09/12/19 1430   BP: 122/72   Pulse: 80   SpO2: 95%   Weight: 191 lb (86.6 kg)   Height: 5' 4\" (1.626 m)     Wt Readings from Last 3 Encounters:   09/12/19 191 lb (86.6 kg)   12/04/18 193 lb (87.5 kg)   07/20/18 186 lb (84.4 kg)     BP Readings from Last 3 Encounters:   09/12/19 122/72   12/04/18 130/82   07/20/18 116/75     Body mass index is 32.79 kg/m². Constitutional: Patient appears well-developed and well-nourished. No distress. Head: Normocephalic and atraumatic. Neck: Normal range of motion. Neck supple. No thyroidmegaly. Cardiovascular: Normal rate, regular rhythm, normal heart sounds and intact distal pulses. Pulmonary/Chest: Effort normal and breath sounds normal. No stridor. No respiratory distress. No wheezes and no rales. Abdominal: Soft. Bowel sounds are normal. No distension and no mass. No tenderness. No rebound and no guarding. Musculoskeletal: No edema and no tenderness. Skin: No rash or erythema. Psychiatric: Normal mood and affect. Behavior is normal.     Assessment/Plan:  Lawrence Martínez was seen today for shortness of breath, chest pain and fatigue. Diagnoses and all orders for this visit:    Atypical chest pain  -     EKG 12 Lead  -     Cancel: Spirometry With OR Without Bronchodilator; Future since pt wants to hold off for now    Mild intermittent reactive airway disease without complication  Trial  albuterol sulfate  (90 Base) MCG/ACT inhaler; Inhale 2 puffs into the lungs 4 times daily as needed for Wheezing    Need for zoster vaccination  -     Zoster recombinant [facility])        Return 1 month on breathing.

## 2019-09-29 DIAGNOSIS — J45.20 MILD INTERMITTENT REACTIVE AIRWAY DISEASE WITHOUT COMPLICATION: ICD-10-CM

## 2019-10-01 DIAGNOSIS — J45.20 MILD INTERMITTENT REACTIVE AIRWAY DISEASE WITHOUT COMPLICATION: ICD-10-CM

## 2019-10-03 RX ORDER — ALBUTEROL SULFATE 90 UG/1
AEROSOL, METERED RESPIRATORY (INHALATION)
Qty: 8.5 INHALER | Refills: 0 | OUTPATIENT
Start: 2019-10-03

## 2019-10-15 ENCOUNTER — OFFICE VISIT (OUTPATIENT)
Dept: INTERNAL MEDICINE CLINIC | Age: 59
End: 2019-10-15
Payer: COMMERCIAL

## 2019-10-15 VITALS
DIASTOLIC BLOOD PRESSURE: 86 MMHG | BODY MASS INDEX: 32.95 KG/M2 | SYSTOLIC BLOOD PRESSURE: 122 MMHG | HEART RATE: 65 BPM | WEIGHT: 193 LBS | OXYGEN SATURATION: 99 % | HEIGHT: 64 IN

## 2019-10-15 DIAGNOSIS — J45.20 RAD (REACTIVE AIRWAY DISEASE), MILD INTERMITTENT, UNCOMPLICATED: ICD-10-CM

## 2019-10-15 DIAGNOSIS — Z23 NEED FOR INFLUENZA VACCINATION: ICD-10-CM

## 2019-10-15 DIAGNOSIS — K21.9 GASTROESOPHAGEAL REFLUX DISEASE WITHOUT ESOPHAGITIS: ICD-10-CM

## 2019-10-15 DIAGNOSIS — M54.31 RIGHT SIDED SCIATICA: ICD-10-CM

## 2019-10-15 DIAGNOSIS — J45.909 ASTHMA DUE TO ENVIRONMENTAL ALLERGIES: Primary | ICD-10-CM

## 2019-10-15 PROBLEM — J30.89 ENVIRONMENTAL AND SEASONAL ALLERGIES: Status: ACTIVE | Noted: 2019-10-15

## 2019-10-15 PROCEDURE — 90471 IMMUNIZATION ADMIN: CPT | Performed by: INTERNAL MEDICINE

## 2019-10-15 PROCEDURE — 99214 OFFICE O/P EST MOD 30 MIN: CPT | Performed by: INTERNAL MEDICINE

## 2019-10-15 PROCEDURE — 90688 IIV4 VACCINE SPLT 0.5 ML IM: CPT | Performed by: INTERNAL MEDICINE

## 2019-10-15 RX ORDER — MELOXICAM 15 MG/1
15 TABLET ORAL DAILY
Qty: 30 TABLET | Refills: 0 | Status: SHIPPED | OUTPATIENT
Start: 2019-10-15 | End: 2019-11-06 | Stop reason: SDUPTHER

## 2019-10-15 RX ORDER — OMEPRAZOLE 20 MG/1
20 CAPSULE, DELAYED RELEASE ORAL DAILY
Qty: 90 CAPSULE | Refills: 0 | Status: SHIPPED | OUTPATIENT
Start: 2019-10-15 | End: 2019-12-09 | Stop reason: SDUPTHER

## 2019-10-15 RX ORDER — MONTELUKAST SODIUM 10 MG/1
10 TABLET ORAL DAILY
Qty: 30 TABLET | Refills: 1 | Status: SHIPPED | OUTPATIENT
Start: 2019-10-15 | End: 2019-12-18

## 2019-11-25 ENCOUNTER — NURSE ONLY (OUTPATIENT)
Dept: INTERNAL MEDICINE CLINIC | Age: 59
End: 2019-11-25

## 2019-11-25 DIAGNOSIS — Z23 NEED FOR ZOSTER VACCINATION: Primary | ICD-10-CM

## 2019-12-09 ENCOUNTER — OFFICE VISIT (OUTPATIENT)
Dept: INTERNAL MEDICINE CLINIC | Age: 59
End: 2019-12-09
Payer: COMMERCIAL

## 2019-12-09 VITALS
WEIGHT: 187 LBS | DIASTOLIC BLOOD PRESSURE: 80 MMHG | HEIGHT: 64 IN | BODY MASS INDEX: 31.92 KG/M2 | TEMPERATURE: 98.4 F | SYSTOLIC BLOOD PRESSURE: 116 MMHG

## 2019-12-09 DIAGNOSIS — K21.9 GASTROESOPHAGEAL REFLUX DISEASE WITHOUT ESOPHAGITIS: ICD-10-CM

## 2019-12-09 DIAGNOSIS — Z23 NEED FOR STREPTOCOCCUS PNEUMONIAE VACCINATION: ICD-10-CM

## 2019-12-09 DIAGNOSIS — Z00.00 ANNUAL PHYSICAL EXAM: Primary | ICD-10-CM

## 2019-12-09 PROCEDURE — 90471 IMMUNIZATION ADMIN: CPT | Performed by: INTERNAL MEDICINE

## 2019-12-09 PROCEDURE — 99396 PREV VISIT EST AGE 40-64: CPT | Performed by: INTERNAL MEDICINE

## 2019-12-09 PROCEDURE — 90732 PPSV23 VACC 2 YRS+ SUBQ/IM: CPT | Performed by: INTERNAL MEDICINE

## 2019-12-09 RX ORDER — OMEPRAZOLE 20 MG/1
20 CAPSULE, DELAYED RELEASE ORAL DAILY
Qty: 90 CAPSULE | Refills: 1 | Status: SHIPPED | OUTPATIENT
Start: 2019-12-09 | End: 2020-10-05

## 2019-12-09 ASSESSMENT — PATIENT HEALTH QUESTIONNAIRE - PHQ9
1. LITTLE INTEREST OR PLEASURE IN DOING THINGS: 0
SUM OF ALL RESPONSES TO PHQ9 QUESTIONS 1 & 2: 0
2. FEELING DOWN, DEPRESSED OR HOPELESS: 0
SUM OF ALL RESPONSES TO PHQ QUESTIONS 1-9: 0
SUM OF ALL RESPONSES TO PHQ QUESTIONS 1-9: 0

## 2020-05-21 ENCOUNTER — TELEMEDICINE (OUTPATIENT)
Dept: INTERNAL MEDICINE CLINIC | Age: 60
End: 2020-05-21
Payer: COMMERCIAL

## 2020-05-21 ENCOUNTER — NURSE TRIAGE (OUTPATIENT)
Dept: OTHER | Facility: CLINIC | Age: 60
End: 2020-05-21

## 2020-05-21 VITALS — BODY MASS INDEX: 31.58 KG/M2 | WEIGHT: 185 LBS | HEIGHT: 64 IN

## 2020-05-21 PROCEDURE — 99213 OFFICE O/P EST LOW 20 MIN: CPT | Performed by: INTERNAL MEDICINE

## 2020-05-21 NOTE — TELEPHONE ENCOUNTER
Reason for Disposition   MODERATE pain (e.g., symptoms interfere with work or school, limping) and present > 3 days    Answer Assessment - Initial Assessment Questions  1. LOCATION and RADIATION: \"Where is the pain located? \"      Right knee aprox    2. QUALITY: \"What does the pain feel like? \"  (e.g., sharp, dull, aching, burning)      Sharp pain   3. SEVERITY: \"How bad is the pain? \" \"What does it keep you from doing? \"   (Scale 1-10; or mild, moderate, severe)    -  MILD (1-3): doesn't interfere with normal activities     -  MODERATE (4-7): interferes with normal activities (e.g., work or school) or awakens from sleep, limping     -  SEVERE (8-10): excruciating pain, unable to do any normal activities, unable to walk      Moderate   4. ONSET: \"When did the pain start? \" \"Does it come and go, or is it there all the time? \"      About 1 month ago got better and about 1.5 weeks ago came back and worse   5. RECURRENT: \"Have you had this pain before? \" If so, ask: \"When, and what happened then? \"      No   6. SETTING: \"Has there been any recent work, exercise or other activity that involved that part of the body? \"       Yard work about a month ago   7. AGGRAVATING FACTORS: \"What makes the knee pain worse? \" (e.g., walking, climbing stairs, running)      Walking , descending stairs   8. ASSOCIATED SYMPTOMS: \"Is there any swelling or redness of the knee? \"     Swollen and red   9. OTHER SYMPTOMS: \"Do you have any other symptoms? \" (e.g., chest pain, difficulty breathing, fever, calf pain)      No other area   10. PREGNANCY: \"Is there any chance you are pregnant? \" \"When was your last menstrual period? \"        no    Protocols used: KNEE PAIN-ADULT-[place]

## 2020-06-02 ENCOUNTER — HOSPITAL ENCOUNTER (OUTPATIENT)
Age: 60
Discharge: HOME | End: 2020-06-02
Payer: COMMERCIAL

## 2020-06-02 LAB
A/G RATIO: 1.6 (ref 1.1–2.2)
ALBUMIN SERPL-MCNC: 4.2 G/DL (ref 3.4–5)
ALP BLD-CCNC: 94 U/L (ref 40–129)
ALT SERPL-CCNC: 14 U/L (ref 10–40)
ANION GAP SERPL CALCULATED.3IONS-SCNC: 9 MMOL/L (ref 3–16)
AST SERPL-CCNC: 16 U/L (ref 15–37)
BASOPHILS ABSOLUTE: 0 K/UL (ref 0–0.2)
BASOPHILS RELATIVE PERCENT: 0.7 %
BILIRUB SERPL-MCNC: 0.4 MG/DL (ref 0–1)
BUN BLDV-MCNC: 14 MG/DL (ref 7–20)
CALCIUM SERPL-MCNC: 9 MG/DL (ref 8.3–10.6)
CHLORIDE BLD-SCNC: 105 MMOL/L (ref 99–110)
CHOLESTEROL, TOTAL: 204 MG/DL (ref 0–199)
CO2: 27 MMOL/L (ref 21–32)
CREAT SERPL-MCNC: 0.6 MG/DL (ref 0.6–1.1)
EOSINOPHILS ABSOLUTE: 0.2 K/UL (ref 0–0.6)
EOSINOPHILS RELATIVE PERCENT: 3.6 %
GFR AFRICAN AMERICAN: >60
GFR NON-AFRICAN AMERICAN: >60
GLOBULIN: 2.7 G/DL
GLUCOSE BLD-MCNC: 92 MG/DL (ref 70–99)
HCT VFR BLD CALC: 38.6 % (ref 36–48)
HDLC SERPL-MCNC: 65 MG/DL (ref 40–60)
HEMOGLOBIN: 12.9 G/DL (ref 12–16)
LDL CHOLESTEROL CALCULATED: 112 MG/DL
LYMPHOCYTES ABSOLUTE: 1.5 K/UL (ref 1–5.1)
LYMPHOCYTES RELATIVE PERCENT: 33.2 %
MCH RBC QN AUTO: 31.6 PG (ref 26–34)
MCHC RBC AUTO-ENTMCNC: 33.5 G/DL (ref 31–36)
MCV RBC AUTO: 94.4 FL (ref 80–100)
MONOCYTES ABSOLUTE: 0.4 K/UL (ref 0–1.3)
MONOCYTES RELATIVE PERCENT: 8.9 %
NEUTROPHILS ABSOLUTE: 2.5 K/UL (ref 1.7–7.7)
NEUTROPHILS RELATIVE PERCENT: 53.6 %
PDW BLD-RTO: 13.6 % (ref 12.4–15.4)
PLATELET # BLD: 260 K/UL (ref 135–450)
PMV BLD AUTO: 8.7 FL (ref 5–10.5)
POTASSIUM SERPL-SCNC: 4.1 MMOL/L (ref 3.5–5.1)
RBC # BLD: 4.09 M/UL (ref 4–5.2)
SODIUM BLD-SCNC: 141 MMOL/L (ref 136–145)
TOTAL PROTEIN: 6.9 G/DL (ref 6.4–8.2)
TRIGL SERPL-MCNC: 137 MG/DL (ref 0–150)
TSH SERPL DL<=0.05 MIU/L-ACNC: 3.84 UIU/ML (ref 0.27–4.2)
VLDLC SERPL CALC-MCNC: 27 MG/DL
WBC # BLD: 4.6 K/UL (ref 4–11)

## 2020-06-02 PROCEDURE — 80061 LIPID PANEL: CPT

## 2020-06-02 PROCEDURE — 85025 COMPLETE CBC W/AUTO DIFF WBC: CPT

## 2020-06-02 PROCEDURE — 36415 COLL VENOUS BLD VENIPUNCTURE: CPT

## 2020-06-02 PROCEDURE — 84443 ASSAY THYROID STIM HORMONE: CPT

## 2020-06-02 PROCEDURE — 80053 COMPREHEN METABOLIC PANEL: CPT

## 2020-10-05 RX ORDER — OMEPRAZOLE 20 MG/1
CAPSULE, DELAYED RELEASE ORAL
Qty: 90 CAPSULE | Refills: 0 | Status: SHIPPED | OUTPATIENT
Start: 2020-10-05 | End: 2021-03-08 | Stop reason: SDUPTHER

## 2020-10-05 NOTE — TELEPHONE ENCOUNTER
LAST REFILL 12/09/19  AMOUNT 90     1 REFILLS  LAST VISIT 05/21/20  NEXT VISIT NONE (physical in Dec?)

## 2020-10-15 ENCOUNTER — NURSE ONLY (OUTPATIENT)
Dept: INTERNAL MEDICINE CLINIC | Age: 60
End: 2020-10-15
Payer: COMMERCIAL

## 2020-10-15 PROCEDURE — 90471 IMMUNIZATION ADMIN: CPT | Performed by: INTERNAL MEDICINE

## 2020-10-15 PROCEDURE — 90688 IIV4 VACCINE SPLT 0.5 ML IM: CPT | Performed by: INTERNAL MEDICINE

## 2020-10-15 NOTE — PROGRESS NOTES
Vaccine Information Sheet, \"Influenza - Inactivated\"  given to Bethany Tapia, or parent/legal guardian of  Bethany Tapia and verbalized understanding. Patient responses:    Have you ever had a reaction to a flu vaccine? No  Are you able to eat eggs without adverse effects? Yes  Do you have any current illness? No  Have you ever had Guillian Bishop Syndrome? No    Flu vaccine given per order. Please see immunization tab.

## 2021-03-08 DIAGNOSIS — K21.9 GASTROESOPHAGEAL REFLUX DISEASE WITHOUT ESOPHAGITIS: ICD-10-CM

## 2021-03-08 RX ORDER — OMEPRAZOLE 20 MG/1
CAPSULE, DELAYED RELEASE ORAL
Qty: 90 CAPSULE | Refills: 0 | Status: SHIPPED | OUTPATIENT
Start: 2021-03-08 | End: 2021-06-14 | Stop reason: SDUPTHER

## 2021-03-08 NOTE — TELEPHONE ENCOUNTER
----- Message from Mihai Curtis sent at 3/5/2021  9:08 AM EST -----  Subject: Refill Request    QUESTIONS  Name of Medication? omeprazole (PRILOSEC) 20 MG delayed release capsule  Patient-reported dosage and instructions? 20mg  How many days do you have left? 5  Preferred Pharmacy? [facility]Y [id number]  Pharmacy phone number (if available)? [phone]  ---------------------------------------------------------------------------  --------------  Yue WILDER  What is the best way for the office to contact you? OK to leave message on   voicemail  Preferred Call Back Phone Number?  [phone]

## 2021-03-13 ENCOUNTER — IMMUNIZATION (OUTPATIENT)
Dept: PRIMARY CARE CLINIC | Age: 61
End: 2021-03-13
Payer: COMMERCIAL

## 2021-03-13 PROCEDURE — 91303 COVID-19, J&J VACCINE, PF, 0.5 ML DOSE: CPT | Performed by: INTERNAL MEDICINE

## 2021-03-13 PROCEDURE — 0031A COVID-19, J&J VACCINE, PF, 0.5 ML DOSE: CPT | Performed by: INTERNAL MEDICINE

## 2021-06-14 ENCOUNTER — OFFICE VISIT (OUTPATIENT)
Dept: INTERNAL MEDICINE CLINIC | Age: 61
End: 2021-06-14
Payer: COMMERCIAL

## 2021-06-14 VITALS
DIASTOLIC BLOOD PRESSURE: 76 MMHG | BODY MASS INDEX: 32.61 KG/M2 | WEIGHT: 191 LBS | HEIGHT: 64 IN | HEART RATE: 77 BPM | SYSTOLIC BLOOD PRESSURE: 126 MMHG | OXYGEN SATURATION: 98 %

## 2021-06-14 DIAGNOSIS — J45.909 ASTHMA DUE TO ENVIRONMENTAL ALLERGIES: ICD-10-CM

## 2021-06-14 DIAGNOSIS — J45.20 RAD (REACTIVE AIRWAY DISEASE), MILD INTERMITTENT, UNCOMPLICATED: ICD-10-CM

## 2021-06-14 DIAGNOSIS — Z00.00 ANNUAL PHYSICAL EXAM: Primary | ICD-10-CM

## 2021-06-14 DIAGNOSIS — K21.9 GASTROESOPHAGEAL REFLUX DISEASE WITHOUT ESOPHAGITIS: ICD-10-CM

## 2021-06-14 LAB
A/G RATIO: 1.9 (ref 1.1–2.2)
ALBUMIN SERPL-MCNC: 4.5 G/DL (ref 3.4–5)
ALP BLD-CCNC: 96 U/L (ref 40–129)
ALT SERPL-CCNC: 14 U/L (ref 10–40)
ANION GAP SERPL CALCULATED.3IONS-SCNC: 12 MMOL/L (ref 3–16)
AST SERPL-CCNC: 15 U/L (ref 15–37)
BASOPHILS ABSOLUTE: 0 K/UL (ref 0–0.2)
BASOPHILS RELATIVE PERCENT: 0.9 %
BILIRUB SERPL-MCNC: 0.3 MG/DL (ref 0–1)
BUN BLDV-MCNC: 16 MG/DL (ref 7–20)
CALCIUM SERPL-MCNC: 9.1 MG/DL (ref 8.3–10.6)
CHLORIDE BLD-SCNC: 104 MMOL/L (ref 99–110)
CHOLESTEROL, TOTAL: 234 MG/DL (ref 0–199)
CO2: 26 MMOL/L (ref 21–32)
CREAT SERPL-MCNC: 0.6 MG/DL (ref 0.6–1.1)
EOSINOPHILS ABSOLUTE: 0.1 K/UL (ref 0–0.6)
EOSINOPHILS RELATIVE PERCENT: 2.8 %
GFR AFRICAN AMERICAN: >60
GFR NON-AFRICAN AMERICAN: >60
GLOBULIN: 2.4 G/DL
GLUCOSE BLD-MCNC: 94 MG/DL (ref 70–99)
HCT VFR BLD CALC: 38.4 % (ref 36–48)
HDLC SERPL-MCNC: 58 MG/DL (ref 40–60)
HEMOGLOBIN: 13.1 G/DL (ref 12–16)
LDL CHOLESTEROL CALCULATED: 154 MG/DL
LYMPHOCYTES ABSOLUTE: 1.3 K/UL (ref 1–5.1)
LYMPHOCYTES RELATIVE PERCENT: 31.7 %
MCH RBC QN AUTO: 32.8 PG (ref 26–34)
MCHC RBC AUTO-ENTMCNC: 34 G/DL (ref 31–36)
MCV RBC AUTO: 96.4 FL (ref 80–100)
MONOCYTES ABSOLUTE: 0.4 K/UL (ref 0–1.3)
MONOCYTES RELATIVE PERCENT: 9.3 %
NEUTROPHILS ABSOLUTE: 2.2 K/UL (ref 1.7–7.7)
NEUTROPHILS RELATIVE PERCENT: 55.3 %
PDW BLD-RTO: 13.5 % (ref 12.4–15.4)
PLATELET # BLD: 244 K/UL (ref 135–450)
PMV BLD AUTO: 9.3 FL (ref 5–10.5)
POTASSIUM SERPL-SCNC: 4.6 MMOL/L (ref 3.5–5.1)
RBC # BLD: 3.98 M/UL (ref 4–5.2)
SODIUM BLD-SCNC: 142 MMOL/L (ref 136–145)
TOTAL PROTEIN: 6.9 G/DL (ref 6.4–8.2)
TRIGL SERPL-MCNC: 109 MG/DL (ref 0–150)
VLDLC SERPL CALC-MCNC: 22 MG/DL
WBC # BLD: 4.1 K/UL (ref 4–11)

## 2021-06-14 PROCEDURE — 99396 PREV VISIT EST AGE 40-64: CPT | Performed by: INTERNAL MEDICINE

## 2021-06-14 PROCEDURE — 36415 COLL VENOUS BLD VENIPUNCTURE: CPT | Performed by: INTERNAL MEDICINE

## 2021-06-14 RX ORDER — OMEPRAZOLE 20 MG/1
CAPSULE, DELAYED RELEASE ORAL
Qty: 90 CAPSULE | Refills: 0 | Status: SHIPPED | OUTPATIENT
Start: 2021-06-14 | End: 2021-11-23

## 2021-06-14 RX ORDER — MONTELUKAST SODIUM 10 MG/1
TABLET ORAL
Qty: 30 TABLET | Refills: 2 | Status: SHIPPED | OUTPATIENT
Start: 2021-06-14 | End: 2021-11-23

## 2021-06-14 SDOH — ECONOMIC STABILITY: FOOD INSECURITY: WITHIN THE PAST 12 MONTHS, YOU WORRIED THAT YOUR FOOD WOULD RUN OUT BEFORE YOU GOT MONEY TO BUY MORE.: NEVER TRUE

## 2021-06-14 SDOH — ECONOMIC STABILITY: FOOD INSECURITY: WITHIN THE PAST 12 MONTHS, THE FOOD YOU BOUGHT JUST DIDN'T LAST AND YOU DIDN'T HAVE MONEY TO GET MORE.: NEVER TRUE

## 2021-06-14 ASSESSMENT — PATIENT HEALTH QUESTIONNAIRE - PHQ9
1. LITTLE INTEREST OR PLEASURE IN DOING THINGS: 0
SUM OF ALL RESPONSES TO PHQ9 QUESTIONS 1 & 2: 0
SUM OF ALL RESPONSES TO PHQ QUESTIONS 1-9: 0
2. FEELING DOWN, DEPRESSED OR HOPELESS: 0
SUM OF ALL RESPONSES TO PHQ QUESTIONS 1-9: 0
SUM OF ALL RESPONSES TO PHQ QUESTIONS 1-9: 0

## 2021-06-14 ASSESSMENT — SOCIAL DETERMINANTS OF HEALTH (SDOH): HOW HARD IS IT FOR YOU TO PAY FOR THE VERY BASICS LIKE FOOD, HOUSING, MEDICAL CARE, AND HEATING?: NOT HARD AT ALL

## 2021-06-14 NOTE — PROGRESS NOTES
for the 6/14/21 encounter (Office Visit) with Raeann Gaffney MD   Medication Sig Dispense Refill    omeprazole (PRILOSEC) 20 MG delayed release capsule TAKE 1 CAPSULE BY MOUTH EVERY DAY 90 capsule 0    montelukast (SINGULAIR) 10 MG tablet TAKE 1 TABLET BY MOUTH EVERY DAY 30 tablet 5    PROAIR  (90 Base) MCG/ACT inhaler INHALE 2 PUFFS INTO THE LUNGS 4 TIMES DAILY AS NEEDED FOR WHEEZING 8.5 Inhaler 0    Cholecalciferol (VITAMIN D3) 2000 UNITS CAPS Take by mouth daily       Multiple Vitamin (MULTIVITAMIN PO) Take by mouth daily          Past Medical History:   Diagnosis Date    Atrophic vaginitis     Gastroesophageal reflux disease without esophagitis 12/4/2018    RAD (reactive airway disease), mild intermittent, uncomplicated 21/03/7688     Past Surgical History:   Procedure Laterality Date    COLONOSCOPY  4/6/16    polyps    FOOT SURGERY Left 03/01/2018     Family History   Problem Relation Age of Onset    Alzheimer's Disease Mother     Arthritis Mother     High Blood Pressure Mother     Thyroid Disease Mother     Arthritis Father     Cancer Father         lung    Arthritis Maternal Grandmother     Alzheimer's Disease Maternal Grandmother     Cancer Maternal Grandfather         prostate    High Blood Pressure Maternal Grandfather     No Known Problems Paternal Grandmother     No Known Problems Paternal Grandfather     Stroke Maternal Uncle     Heart Attack Paternal Uncle 72        CABG in his [de-identified]    Breast Cancer Maternal Cousin      Social History     Socioeconomic History    Marital status:      Spouse name: Not on file    Number of children: 3    Years of education: Not on file    Highest education level: Not on file   Occupational History    Occupation: retired   Tobacco Use    Smoking status: Never Smoker    Smokeless tobacco: Never Used   Vaping Use    Vaping Use: Never used   Substance and Sexual Activity    Alcohol use:  Yes     Alcohol/week: 2.0 standard drinks Types: 2 Glasses of wine per week    Drug use: No    Sexual activity: Yes     Partners: Male   Other Topics Concern    Not on file   Social History Narrative    Not on file     Social Determinants of Health     Financial Resource Strain: Low Risk     Difficulty of Paying Living Expenses: Not hard at all   Food Insecurity: No Food Insecurity    Worried About [address] in the Last Year: Never true    [facility] in the Last Year: Never true   Transportation Needs:     Lack of Transportation (Medical):  Lack of Transportation (Non-Medical):    Physical Activity:     Days of Exercise per Week:     Minutes of Exercise per Session:    Stress:     Feeling of Stress :    Social Connections:     Frequency of Communication with Friends and Family:     Frequency of Social Gatherings with Friends and Family:     Attends Buddhist Services:     Active Member of Clubs or Organizations:     Attends Club or Organization Meetings:     Marital Status:    Intimate Partner Violence:     Fear of Current or Ex-Partner:     Emotionally Abused:     Physically Abused:     Sexually Abused:        Review of Systems:  A comprehensive review of systems was negative except for what was noted in the HPI. Physical Exam:   Vitals:    06/14/21 0846   BP: 126/76   Pulse: 77   SpO2: 98%   Weight: 191 lb (86.6 kg)   Height: 5' 4\" (1.626 m)     Body mass index is 32.79 kg/m². Constitutional: She is oriented to person, place, and time. She appears well-developed and well-nourished. No distress. HEENT:   Head: Normocephalic and atraumatic. Right Ear: Tympanic membrane, external ear and ear canal normal.   Left Ear: Tympanic membrane, external ear and ear canal normal.   Mouth/Throat: Oropharynx is clear and moist, and mucous membranes are normal.  There is no cervical adenopathy. Eyes: Conjunctivae and extraocular motions are normal. Pupils are equal, round, and reactive to light. Neck: Supple.  No JVD present. Carotid bruit is not present. No mass and no thyromegaly present. Cardiovascular: Normal rate, regular rhythm, normal heart sounds and intact distal pulses. Exam reveals no gallop and no friction rub. No murmur heard. Pulmonary/Chest: Effort normal and breath sounds normal. No respiratory distress. She has no wheezes, rhonchi or rales. Abdominal: Soft, non-tender. Bowel sounds and aorta are normal. She exhibits no organomegaly, mass or bruit. Genitourinary: performed by gynecologist.  Breast exam:  performed by specialist.  Musculoskeletal: Normal range of motion, no synovitis. She exhibits no edema. Neurological: She is alert and oriented to person, place, and time. She has normal reflexes. No cranial nerve deficit. Coordination normal.   Skin: Skin is warm and dry. There is no rash or erythema. No suspicious lesions noted. Psychiatric: She has a normal mood and affect.  Her speech is normal and behavior is normal. Judgment, cognition and memory are normal.       Preventive Care:  Health Maintenance   Topic Date Due    DTaP/Tdap/Td vaccine (2 - Td or Tdap) 11/26/2022    Cervical cancer screen  12/09/2022    Breast cancer screen  03/10/2023    Lipid screen  06/02/2025    Colon cancer screen colonoscopy  04/26/2026    Pneumococcal 0-64 years Vaccine (2 of 2) 08/30/2026    Flu vaccine  Completed    Shingles Vaccine  Completed    COVID-19 Vaccine  Completed    Hepatitis C screen  Completed    HIV screen  Completed    Hepatitis A vaccine  Aged Out    Hepatitis B vaccine  Aged Out    Hib vaccine  Aged Out    Meningococcal (ACWY) vaccine  Aged Out      Hx abnormal PAP: no  Sexual activity: has sex with males   Last eye exam: 2020, normal  Exercise: walks 3 time(s) per week       Preventive plan of care for Shayan Cosme        6/14/2021           Preventive Measures Status       Recommendations for screening                   Diabetes Screen  Glucose (mg/dL)   Date Value 06/02/2020 92    Test recommended and ordered   Cholesterol Screen  Lab Results   Component Value Date    CHOL 204 (H) 06/02/2020    TRIG 137 06/02/2020    HDL 65 (H) 06/02/2020    LDLCALC 112 (H) 06/02/2020    Test recommended and ordered       Weight: Body mass index is 32.79 kg/m².   5' 4\" (1.626 m)191 lb (86.6 kg)    Your BMI is 25 or greater, which indicates that you are overweight        Recommended Immunizations    Immunization History   Administered Date(s) Administered    COVID-19, J&J, PF, 0.5 mL 03/13/2021    Influenza 10/05/2010    Influenza Virus Vaccine 10/13/2011, 11/26/2012, 10/28/2015, 11/09/2017    Influenza, Intradermal, Preservative free 09/24/2013, 12/23/2014    Influenza, Pablo Gracia, IM, (6 mo and older Fluzone, Flulaval, Fluarix and 3 yrs and older Afluria) 10/15/2019, 10/15/2020    Influenza, Pablo Gracia, IM, PF (6 mo and older Fluzone, Flulaval, Fluarix, and 3 yrs and older Afluria) 10/25/2016, 11/05/2018    Pneumococcal Conjugate 13-valent ([id number]) 10/28/2015    Pneumococcal Polysaccharide ([id number]) 12/09/2019    Tdap (Boostrix, Adacel) 11/26/2012    Zoster Recombinant (Shingrix) 09/12/2019, 11/25/2019        Influenza vaccine:  recommended every fall         Other Recommendations ·   See a dentist every 6 months  · Try to get at least 30 minutes of exercise 3-5 days per week  · Always wear a seat belt when traveling in a car  · Always wear a helmet when riding a bicycle or motorcycle  · When exposed to the sun, use a sunscreen that protects against both UVA and UVB radiation with an SPF of 30 or greater- reapply every 2 to 3 hours or after sweating, drying off with a towel, or swimming  · You need 8699-3587 mg of calcium and 3730-8077 IU of vitamin D per day- it is possible to meet your calcium requirement with diet alone, but a vitamin D supplement is usually necessary                 Assessment/Plan:    Beth Logan was seen today for annual exam.    Diagnoses and all orders for this visit:    Annual physical exam  -     Lipid Panel  -     Comprehensive Metabolic Panel  -     CBC Auto Differential    Gastroesophageal reflux disease without esophagitis  -     omeprazole (PRILOSEC) 20 MG delayed release capsule; TAKE 1 CAPSULE BY MOUTH EVERY DAY    Asthma due to environmental allergies  -     montelukast (SINGULAIR) 10 MG tablet; TAKE 1 TABLET BY MOUTH EVERY DAY    RAD (reactive airway disease), mild intermittent, uncomplicated  -     montelukast (SINGULAIR) 10 MG tablet; TAKE 1 TABLET BY MOUTH EVERY DAY        Return Fasting Physical in 1 year.

## 2021-09-13 ENCOUNTER — TELEPHONE (OUTPATIENT)
Dept: INTERNAL MEDICINE CLINIC | Age: 61
End: 2021-09-13

## 2021-09-13 ENCOUNTER — TELEMEDICINE (OUTPATIENT)
Dept: INTERNAL MEDICINE CLINIC | Age: 61
End: 2021-09-13
Payer: MEDICAID

## 2021-09-13 DIAGNOSIS — J45.20 MILD INTERMITTENT REACTIVE AIRWAY DISEASE WITHOUT COMPLICATION: ICD-10-CM

## 2021-09-13 DIAGNOSIS — U07.1 COVID-19 VIRUS INFECTION: Primary | ICD-10-CM

## 2021-09-13 PROCEDURE — 99212 OFFICE O/P EST SF 10 MIN: CPT | Performed by: INTERNAL MEDICINE

## 2021-09-13 RX ORDER — ALBUTEROL SULFATE 90 UG/1
AEROSOL, METERED RESPIRATORY (INHALATION)
Qty: 1 EACH | Refills: 0 | Status: SHIPPED | OUTPATIENT
Start: 2021-09-13

## 2021-09-13 NOTE — PROGRESS NOTES
Checo Frey  YOB: 1960    Date of Service:  9/13/2021    Chief Complaint:      Chief Complaint   Patient presents with    Concern For [id number]     tested positive 09/05. still has lingering chest congestion        Assessment/Plan:  Joaquín Malik was seen today for concern for covid-19. Diagnoses and all orders for this visit:    COVID-19 virus infection  -     albuterol sulfate HFA (PROAIR HFA) 108 (90 Base) MCG/ACT inhaler; INHALE 2 PUFFS INTO THE LUNGS 4 TIMES DAILY AS NEEDED FOR WHEEZING    Mild intermittent reactive airway disease without complication  -     albuterol sulfate HFA (PROAIR HFA) 108 (90 Base) MCG/ACT inhaler; INHALE 2 PUFFS INTO THE LUNGS 4 TIMES DAILY AS NEEDED FOR WHEEZING    restart singulair 10 mqhs  If you're still having congestion, buy some over the counter Mucinex 1200 mg or Mucinex DM 1200 mg (if coughing) 1 pill twice a day  to thin up your secretions so it can drain to relieve pressure in your face/ears. Return Fasting Physical in June. HPI:  Checo Frey is a 62 y.o. She was tested positive for COVID 8 days ago and still coughing and facial congestion, it's been clear.   She does    No results found for: LABA1C, LABMICR  Lab Results   Component Value Date     06/14/2021    K 4.6 06/14/2021     06/14/2021    CO2 26 06/14/2021    BUN 16 06/14/2021    CREATININE 0.6 06/14/2021    GLUCOSE 94 06/14/2021    CALCIUM 9.1 06/14/2021     Lab Results   Component Value Date    CHOL 234 06/14/2021    TRIG 109 06/14/2021    HDL 58 06/14/2021    HDL 65 10/13/2011    LDLCALC 154 06/14/2021     Lab Results   Component Value Date    ALT 14 06/14/2021    AST 15 06/14/2021     Lab Results   Component Value Date    TSH 3.84 06/02/2020    TSH 2.33 01/12/2016     Lab Results   Component Value Date    WBC 4.1 06/14/2021    HGB 13.1 06/14/2021    HCT 38.4 06/14/2021    MCV 96.4 06/14/2021     06/14/2021     No results found for: INR   No results found for: PSA   No results found for: [facility]     Patient Active Problem List   Diagnosis    Ganglion cyst of left foot    Bunion, left    Dermatitis    Gastroesophageal reflux disease without esophagitis    RAD (reactive airway disease), mild intermittent, uncomplicated    Asthma due to environmental allergies       No Known Allergies  Outpatient Medications Marked as Taking for the 9/13/21 encounter (Telemedicine) with Cesar Gaffney MD   Medication Sig Dispense Refill    montelukast (SINGULAIR) 10 MG tablet TAKE 1 TABLET BY MOUTH EVERY DAY 30 tablet 2    omeprazole (PRILOSEC) 20 MG delayed release capsule TAKE 1 CAPSULE BY MOUTH EVERY DAY 90 capsule 0    PROAIR  (90 Base) MCG/ACT inhaler INHALE 2 PUFFS INTO THE LUNGS 4 TIMES DAILY AS NEEDED FOR WHEEZING 8.5 Inhaler 0    Cholecalciferol (VITAMIN D3) 2000 UNITS CAPS Take by mouth daily       Multiple Vitamin (MULTIVITAMIN PO) Take by mouth daily            Review of Systems: 14 systems were negative except of what was stated on HPI    Nursing note and vitals reviewed. There were no vitals filed for this visit. Wt Readings from Last 3 Encounters:   06/14/21 191 lb (86.6 kg)   05/21/20 185 lb (83.9 kg)   12/09/19 187 lb (84.8 kg)     BP Readings from Last 3 Encounters:   06/14/21 126/76   12/09/19 116/80   10/15/19 122/86     There is no height or weight on file to calculate BMI. Constitutional: Patient appears well-developed and well-nourished. No distress. Head: Normocephalic and atraumatic. Neck: Normal range of motion. Neck supple. No thyroidmegaly. Cardiovascular: Normal rate, regular rhythm, normal heart sounds and intact distal pulses. Pulmonary/Chest: Effort normal and breath sounds normal. No stridor. No respiratory distress. No wheezes and no rales. Abdominal: Soft. Bowel sounds are normal. No distension and no mass. No tenderness. No rebound and no guarding. Musculoskeletal: No edema and no tenderness.    Skin: No rash or erythema. Psychiatric: Normal mood and affect.  Behavior is normal.

## 2021-09-13 NOTE — TELEPHONE ENCOUNTER
----- Message from Robby Lazar sent at 9/13/2021 10:10 AM EDT -----  Subject: Message to Provider    QUESTIONS  Information for Provider? patient calling to document that she tested   positive for COVID 19 on 9/5. having lingering congestion and is asking if   she should be prescribed steroid.   ---------------------------------------------------------------------------  --------------  CALL BACK INFO  What is the best way for the office to contact you? OK to leave message on   voicemail  Preferred Call Back Phone Number? [phone]  ---------------------------------------------------------------------------  --------------  SCRIPT ANSWERS  Relationship to Patient?  Self

## 2021-10-18 ENCOUNTER — TELEPHONE (OUTPATIENT)
Dept: INTERNAL MEDICINE CLINIC | Age: 61
End: 2021-10-18

## 2021-10-18 NOTE — TELEPHONE ENCOUNTER
Inform pt we will need to discuss more to order tests since it's not something insurance would cover without further evaluation, can schedule for tomorrow or later

## 2021-10-18 NOTE — TELEPHONE ENCOUNTER
----- Message from Marly Stewart sent at 10/18/2021  9:56 AM EDT -----  Subject: Referral Request    QUESTIONS   Reason for referral request? Pt needs an Angio Cat scan scheduled-she is 1   of 5 siblings, 2 of them have had stints on  maker & their Dr Joy Repress   all siblings to be tested; please contact pt when order has been placed   Has the physician seen you for this condition before? No   Preferred Specialist (if applicable)? Luciano Fink you already have an appointment scheduled? No  Additional Information for Provider?   ---------------------------------------------------------------------------  --------------  CALL BACK INFO  What is the best way for the office to contact you? OK to leave message on   voicemail  Preferred Call Back Phone Number?  [phone] mother

## 2021-10-19 ENCOUNTER — TELEMEDICINE (OUTPATIENT)
Dept: INTERNAL MEDICINE CLINIC | Age: 61
End: 2021-10-19
Payer: MEDICAID

## 2021-10-19 DIAGNOSIS — E78.2 MIXED HYPERLIPIDEMIA: Primary | ICD-10-CM

## 2021-10-19 DIAGNOSIS — Z82.49 FH: CAD (CORONARY ARTERY DISEASE): ICD-10-CM

## 2021-10-19 PROCEDURE — 99212 OFFICE O/P EST SF 10 MIN: CPT | Performed by: INTERNAL MEDICINE

## 2021-10-19 RX ORDER — ROSUVASTATIN CALCIUM 10 MG/1
10 TABLET, COATED ORAL NIGHTLY
Qty: 30 TABLET | Refills: 1 | Status: SHIPPED | OUTPATIENT
Start: 2021-10-19 | End: 2021-12-13

## 2021-10-19 NOTE — PROGRESS NOTES
Pursuant to the emergency declaration under the [facility] authority and the [employer] Act, this Virtual  Video Visit was conducted, with patient's consent, to reduce the patient's risk of exposure to COVID-19 and provide continuity of care. Service is  provided through a video synchronous discussion virtually to substitute for in-person clinic visit with the patient being at home and Dr. Biju Mauricio being at home. Patient consent to the video visit. Date of Service:  10/19/2021    Chief Complaint:      Chief Complaint   Patient presents with    Other     requesting CT angio of heart due to extensive cardiac family history. Assessment/Plan:    Angie Cottrell was seen today for other. Diagnoses and all orders for this visit:    Mixed hyperlipidemia  -     CT CARDIAC CALCIUM SCORING; Future  -     rosuvastatin (CRESTOR) 10 MG tablet; Take 1 tablet by mouth nightly  -     Lipid Panel; Future  -     HEPATIC FUNCTION PANEL; Future    FH: CAD (coronary artery disease)  -     CT CARDIAC CALCIUM SCORING; Future  -     rosuvastatin (CRESTOR) 10 MG tablet; Take 1 tablet by mouth nightly        Return June 6 at 8:30 Fasting Physical.      HPI:  Korey Danielle is a 62 y.o. She complain of years of irregular heart rate and high cholesterol and afraid of having heart disease. Her brother had heart attack at age 72 after having a normal stress test.  She has another another brother with MI at age 55 y/o and 90 % blockage. She wants to get a CT of her heart to see what her risk is having an MI. Hyperlipidemia:  Patient is  following a low fat, low cholesterol diet. She is  exercising regularly. OTC Supplements: none.     The 10-year ASCVD risk score (Opal Lozano, et al., 2013) is: 3.5%    Values used to calculate the score:      Age: 62 years      Sex: Female      Is Non- : No      Diabetic: No      Tobacco smoker: No      Systolic Blood Pressure: 746 mmHg      Is BP treated: No      HDL Cholesterol: 58 mg/dL      Total Cholesterol: 234 mg/dL    No results found for: LABA1C, LABMICR  Lab Results   Component Value Date     06/14/2021    K 4.6 06/14/2021     06/14/2021    CO2 26 06/14/2021    BUN 16 06/14/2021    CREATININE 0.6 06/14/2021    GLUCOSE 94 06/14/2021    CALCIUM 9.1 06/14/2021     Lab Results   Component Value Date    CHOL 234 06/14/2021    TRIG 109 06/14/2021    HDL 58 06/14/2021    HDL 65 10/13/2011    LDLCALC 154 06/14/2021     Lab Results   Component Value Date    ALT 14 06/14/2021    AST 15 06/14/2021     Lab Results   Component Value Date    TSH 3.84 06/02/2020    TSH 2.33 01/12/2016     Lab Results   Component Value Date    WBC 4.1 06/14/2021    HGB 13.1 06/14/2021    HCT 38.4 06/14/2021    MCV 96.4 06/14/2021     06/14/2021     No results found for: INR   No results found for: PSA   No results found for: LABURIC     Patient Active Problem List   Diagnosis    Ganglion cyst of left foot    Bunion, left    Dermatitis    Gastroesophageal reflux disease without esophagitis    RAD (reactive airway disease), mild intermittent, uncomplicated    Asthma due to environmental allergies       No Known Allergies  Outpatient Medications Marked as Taking for the 10/19/21 encounter (Telemedicine) with Sawyer Gaffnye MD   Medication Sig Dispense Refill    albuterol sulfate HFA (PROAIR HFA) 108 (90 Base) MCG/ACT inhaler INHALE 2 PUFFS INTO THE LUNGS 4 TIMES DAILY AS NEEDED FOR WHEEZING 1 each 0    montelukast (SINGULAIR) 10 MG tablet TAKE 1 TABLET BY MOUTH EVERY DAY 30 tablet 2    omeprazole (PRILOSEC) 20 MG delayed release capsule TAKE 1 CAPSULE BY MOUTH EVERY DAY 90 capsule 0    Cholecalciferol (VITAMIN D3) 2000 UNITS CAPS Take by mouth daily       Multiple Vitamin (MULTIVITAMIN PO) Take by mouth daily            Review of Systems: 14 systems were negative except of what

## 2021-10-27 ENCOUNTER — OFFICE VISIT (OUTPATIENT)
Dept: INTERNAL MEDICINE CLINIC | Age: 61
End: 2021-10-27
Payer: MEDICAID

## 2021-10-27 VITALS
OXYGEN SATURATION: 99 % | SYSTOLIC BLOOD PRESSURE: 126 MMHG | HEIGHT: 64 IN | WEIGHT: 188 LBS | HEART RATE: 86 BPM | BODY MASS INDEX: 32.1 KG/M2 | DIASTOLIC BLOOD PRESSURE: 80 MMHG

## 2021-10-27 DIAGNOSIS — R00.2 PALPITATIONS: Primary | ICD-10-CM

## 2021-10-27 DIAGNOSIS — R07.89 CHEST PRESSURE: ICD-10-CM

## 2021-10-27 PROCEDURE — 99213 OFFICE O/P EST LOW 20 MIN: CPT | Performed by: INTERNAL MEDICINE

## 2021-10-27 PROCEDURE — 93000 ELECTROCARDIOGRAM COMPLETE: CPT | Performed by: INTERNAL MEDICINE

## 2021-10-27 NOTE — PROGRESS NOTES
Petra Zhang  YOB: 1960    Date of Service:  10/27/2021    Chief Complaint:      Chief Complaint   Patient presents with    Fatigue    Dizziness    Palpitations       Assessment/Plan:  Yasmin Lee was seen today for fatigue, dizziness and palpitations. Diagnoses and all orders for this visit:    Palpitations  -     EKG 12 Lead  -     Holter Monitor 24 Hour; Future    Chest pressure  -     EKG 12 Lead  -     Holter Monitor 24 Hour; Future        Return if symptoms worsen or fail to improve. HPI:  Petra Zhang is a 62 y.o. She noticed her heart pounding with lightheadedness while standing and talking to her neighbor. It did go went away after 10-15 mins when she sat down 4 days ago. She's been feeling the palpitations almost continuously since then with some chest tightness since the episode. She denies increase stress recently.     No results found for: Katia Magdy  Lab Results   Component Value Date     06/14/2021    K 4.6 06/14/2021     06/14/2021    CO2 26 06/14/2021    BUN 16 06/14/2021    CREATININE 0.6 06/14/2021    GLUCOSE 94 06/14/2021    CALCIUM 9.1 06/14/2021     Lab Results   Component Value Date    CHOL 234 06/14/2021    TRIG 109 06/14/2021    HDL 58 06/14/2021    HDL 65 10/13/2011    LDLCALC 154 06/14/2021     Lab Results   Component Value Date    ALT 14 06/14/2021    AST 15 06/14/2021     Lab Results   Component Value Date    TSH 3.84 06/02/2020    TSH 2.33 01/12/2016     Lab Results   Component Value Date    WBC 4.1 06/14/2021    HGB 13.1 06/14/2021    HCT 38.4 06/14/2021    MCV 96.4 06/14/2021     06/14/2021     No results found for: INR   No results found for: PSA   No results found for: LABURIC     Patient Active Problem List   Diagnosis    Ganglion cyst of left foot    Bunion, left    Dermatitis    Gastroesophageal reflux disease without esophagitis    RAD (reactive airway disease), mild intermittent, uncomplicated    Asthma due to environmental allergies       No Known Allergies  Outpatient Medications Marked as Taking for the 10/27/21 encounter (Office Visit) with Tigre Law MD   Medication Sig Dispense Refill    rosuvastatin (CRESTOR) 10 MG tablet Take 1 tablet by mouth nightly 30 tablet 1    albuterol sulfate HFA (PROAIR HFA) 108 (90 Base) MCG/ACT inhaler INHALE 2 PUFFS INTO THE LUNGS 4 TIMES DAILY AS NEEDED FOR WHEEZING 1 each 0    montelukast (SINGULAIR) 10 MG tablet TAKE 1 TABLET BY MOUTH EVERY DAY 30 tablet 2    omeprazole (PRILOSEC) 20 MG delayed release capsule TAKE 1 CAPSULE BY MOUTH EVERY DAY 90 capsule 0    Cholecalciferol (VITAMIN D3) 2000 UNITS CAPS Take by mouth daily       Multiple Vitamin (MULTIVITAMIN PO) Take by mouth daily            Review of Systems: 14 systems were negative except of what was stated on HPI    Nursing note and vitals reviewed. Vitals:    10/27/21 1159   BP: 126/80   Pulse: 86   SpO2: 99%   Weight: 188 lb (85.3 kg)   Height: 5' 4\" (1.626 m)     Wt Readings from Last 3 Encounters:   10/27/21 188 lb (85.3 kg)   06/14/21 191 lb (86.6 kg)   05/21/20 185 lb (83.9 kg)     BP Readings from Last 3 Encounters:   10/27/21 126/80   06/14/21 126/76   12/09/19 116/80     Body mass index is 32.27 kg/m². Constitutional: Patient appears well-developed and well-nourished. No distress. Head: Normocephalic and atraumatic. Neck: Normal range of motion. Neck supple. No thyroidmegaly. Cardiovascular: Normal rate, regular rhythm, normal heart sounds and intact distal pulses. Pulmonary/Chest: Effort normal and breath sounds normal. No stridor. No respiratory distress. No wheezes and no rales. Abdominal: Soft. Bowel sounds are normal. No distension and no mass. No tenderness. No rebound and no guarding. Musculoskeletal: No edema and no tenderness. Skin: No rash or erythema.

## 2021-11-01 ENCOUNTER — HOSPITAL ENCOUNTER (OUTPATIENT)
Dept: NON INVASIVE DIAGNOSTICS | Age: 61
Discharge: HOME | End: 2021-11-01
Payer: MEDICAID

## 2021-11-01 DIAGNOSIS — R07.89 CHEST PRESSURE: ICD-10-CM

## 2021-11-01 DIAGNOSIS — R00.2 PALPITATIONS: ICD-10-CM

## 2021-11-01 PROCEDURE — 93226 XTRNL ECG REC<48 HR SCAN A/R: CPT

## 2021-11-01 PROCEDURE — 93225 XTRNL ECG REC<48 HRS REC: CPT

## 2021-11-08 ENCOUNTER — TELEPHONE (OUTPATIENT)
Dept: INTERNAL MEDICINE CLINIC | Age: 61
End: 2021-11-08

## 2021-11-08 NOTE — TELEPHONE ENCOUNTER
[facility] calling for Dr. Keshawn Kinney office . They have had difficulty with their computers today, and were having trouble getting results . She said to keep checking in Epic, and they should be there yet today.

## 2021-11-09 DIAGNOSIS — I49.3 SYMPTOMATIC PVCS: Primary | ICD-10-CM

## 2021-11-09 DIAGNOSIS — R42 DIZZINESS: ICD-10-CM

## 2021-11-09 LAB
ACQUISITION DURATION: NORMAL S
AVERAGE HEART RATE: 86 BPM
EKG DIAGNOSIS: NORMAL
HOLTER MAX HEART RATE: 119 BPM
HOOKUP DATE: NORMAL
HOOKUP TIME: NORMAL
MAX HEART RATE TIME/DATE: NORMAL
MIN HEART RATE TIME/DATE: NORMAL
MIN HEART RATE: 62 BPM
NUMBER OF QRS COMPLEXES: NORMAL
NUMBER OF SUPRAVENTRICULAR COUPLETS: 2
NUMBER OF SUPRAVENTRICULAR ECTOPICS: 176
NUMBER OF SUPRAVENTRICULAR ISOLATED BEATS: 151
NUMBER OF VENTRICULAR BIGEMINAL CYCLES: 4
NUMBER OF VENTRICULAR COUPLETS: 28
NUMBER OF VENTRICULAR ECTOPICS: 1296

## 2021-11-19 ENCOUNTER — TELEPHONE (OUTPATIENT)
Dept: CARDIOLOGY CLINIC | Age: 61
End: 2021-11-19

## 2021-11-19 ENCOUNTER — OFFICE VISIT (OUTPATIENT)
Dept: CARDIOLOGY CLINIC | Age: 61
End: 2021-11-19
Payer: MEDICAID

## 2021-11-19 VITALS
DIASTOLIC BLOOD PRESSURE: 60 MMHG | OXYGEN SATURATION: 98 % | WEIGHT: 188.4 LBS | BODY MASS INDEX: 32.17 KG/M2 | HEART RATE: 89 BPM | HEIGHT: 64 IN | SYSTOLIC BLOOD PRESSURE: 110 MMHG

## 2021-11-19 DIAGNOSIS — R07.2 PRECORDIAL PAIN: ICD-10-CM

## 2021-11-19 DIAGNOSIS — I71.21 ANEURYSM OF ASCENDING AORTA: Primary | ICD-10-CM

## 2021-11-19 DIAGNOSIS — Z82.49 FAMILY HISTORY OF HEART DISEASE: ICD-10-CM

## 2021-11-19 DIAGNOSIS — I25.10 CORONARY ARTERY DISEASE INVOLVING NATIVE CORONARY ARTERY OF NATIVE HEART WITHOUT ANGINA PECTORIS: ICD-10-CM

## 2021-11-19 DIAGNOSIS — R00.2 PALPITATIONS: ICD-10-CM

## 2021-11-19 PROCEDURE — 99244 OFF/OP CNSLTJ NEW/EST MOD 40: CPT | Performed by: INTERNAL MEDICINE

## 2021-11-19 NOTE — LETTER
November 19,2021  Shruti Pena  1960        [address]   Cardiac Consultation    Referring Provider:  Corry Reyes MD     Chief Complaint   Patient presents with   [employer] New Patient    Palpitations      Shruti Pena   1960    History of Present Illness:    Shruti Pena is a 62 y.o. female who is here today as a NEW patient consult for cardiology, referred by Corry Reyes MD for palpitations. She has a past medical history of asthma,  hyperlipidemia, ascending aorta aneurysmal 3.5 in 2009, 3.9 10/2021. CT calcium score 10/2021 was 54. Today she states that 3 weeks ago she started to have palpitations which felt like her heart was pounding. She was talking to her neighbor at the time and had to sit in the grass because she felt like she may pass out. This spell lasted for a total of 3 days on and off. She had another episode of feeling like she may pass out with her heart pounding at the time. Feels like hard beats but also a faster heart rate. She states she has not checked her heart rate during these events. She has a family history of heart disease in her brothers one who had recently had stent placement. She states she had her symptoms while wearing her monitor but not as severe. She has chest pain which feels like a tightness which started years ago. The pain is seperate from her palpitations. She thinks the pain may be related to her posture. Not exertional. No assoc SOB. Unchanged for years. Lasts minutes. She has fatigue with exercise especially in the warmer weather. Patient currently denies any weight gain, edema, shortness of breath, and syncope. Past Medical History:   has a past medical history of Atrophic vaginitis, Gastroesophageal reflux disease without esophagitis, and RAD (reactive airway disease), mild intermittent, uncomplicated.     Surgical History:   has a past surgical history that includes Colonoscopy (4/6/16) and Foot surgery (Left, 03/01/2018). Social History:   reports that she has never smoked. She has never used smokeless tobacco. She reports current alcohol use of about 2.0 standard drinks of alcohol per week. She reports that she does not use drugs. Family History:  family history includes Alzheimer's Disease in her maternal grandmother and mother; Arthritis in her father, maternal grandmother, and mother; Breast Cancer in her maternal cousin; Cancer in her father and maternal grandfather; Heart Attack (age of onset: 72) in her paternal uncle; High Blood Pressure in her maternal grandfather and mother; No Known Problems in her paternal grandfather and paternal grandmother; Stroke in her maternal uncle; Thyroid Disease in her mother. Home Medications:  Prior to Admission medications    Medication Sig Start Date End Date Taking? Authorizing Provider   rosuvastatin (CRESTOR) 10 MG tablet Take 1 tablet by mouth nightly 10/19/21  Yes Stephani Gaffney MD   albuterol sulfate HFA (PROAIR HFA) 108 (90 Base) MCG/ACT inhaler INHALE 2 PUFFS INTO THE LUNGS 4 TIMES DAILY AS NEEDED FOR WHEEZING 9/13/21  Yes Stephani Gaffney MD   montelukast (SINGULAIR) 10 MG tablet TAKE 1 TABLET BY MOUTH EVERY DAY  Patient taking differently: as needed TAKE 1 TABLET BY MOUTH EVERY DAY 6/14/21  Yes Stephani Gaffney MD   omeprazole (PRILOSEC) 20 MG delayed release capsule TAKE 1 CAPSULE BY MOUTH EVERY DAY 6/14/21  Yes Stephani Gaffney MD   Cholecalciferol (VITAMIN D3) 2000 UNITS CAPS Take by mouth daily    Yes Historical Provider, MD   Multiple Vitamin (MULTIVITAMIN PO) Take by mouth daily    Yes Historical Provider, MD        Allergies:  Patient has no known allergies. Review of Systems:   · Constitutional: there has been no unanticipated weight loss. There's been no change in energy level, sleep pattern, or activity level. · Eyes: No visual changes or diplopia. No scleral icterus. · ENT: No Headaches, hearing loss or vertigo.  No mouth sores or sore throat. · Cardiovascular: Reviewed in HPI  · Respiratory: No cough or wheezing, no sputum production. No hematemesis. · Gastrointestinal: No abdominal pain, appetite loss, blood in stools. No change in bowel or bladder habits. · Genitourinary: No dysuria, trouble voiding, or hematuria. · Musculoskeletal:  No gait disturbance, weakness or joint complaints. · Integumentary: No rash or pruritis. · Neurological: No headache, diplopia, change in muscle strength, numbness or tingling. No change in gait, balance, coordination, mood, affect, memory, mentation, behavior. · Psychiatric: No anxiety, no depression. · Endocrine: No malaise, fatigue or temperature intolerance. No excessive thirst, fluid intake, or urination. No tremor. · Hematologic/Lymphatic: No abnormal bruising or bleeding, blood clots or swollen lymph nodes. · Allergic/Immunologic: No nasal congestion or hives. Physical Examination:    Vitals:    11/19/21 1325   BP: 110/60   Pulse: 89   SpO2: 98%        Constitutional and General Appearance: NAD   Respiratory:  · Normal excursion and expansion without use of accessory muscles  · Resp Auscultation: Normal breath sounds without dullness  Cardiovascular:  · The apical impulses not displaced  · Heart tones are crisp and normal  · Cervical veins are not engorged  · The carotid upstroke is normal in amplitude and contour without delay or bruit  · Normal S1S2, No S3, No Murmur  · Peripheral pulses are symmetrical and full  · There is no clubbing, cyanosis of the extremities.   · No edema  · Femoral Arteries: 2+ and equal  · Pedal Pulses: 2+ and equal   Abdomen:  · No masses or tenderness  · Liver/Spleen: No Abnormalities Noted  Neurological/Psychiatric:  · Alert and oriented in all spheres  · Moves all extremities well  · Exhibits normal gait balance and coordination  · No abnormalities of mood, affect, memory, mentation, or behavior are noted    Echocardiogram 2009  EF 55-60%       24 Hour Holter monitor 11/1/2021  The rhythm was sinus. Average TN interval 0.16 average QRS duration 0.08.2. Occasional PVC's and ventricular couplets, rare PAC. 3, Short 6 beat run PAT4. Assessment:  Coronary artery disease- noted on CT calcium score 10/2021   Chest pain - atypical   Palpitations - possible arrhythmia   Dizziness/near syncope   Hyperlipidemia - statin just started   History of ascending aorta aneurysmal 3.5 in 2009, 3.9 by CT 10/2021   COVID + last September   Family history of heart diease in brothers   History of various vein surgery      Plan:  Asprin 81 mg daily - R/B/A/E discussed    Stress echocardiogram  Vascular screening test   Recommend a 30 day cardiac event monitor to be mailed to your home   Plan to repeat CT of the chest without contrast in 1 year   Cardiac medications reviewed including indications and pertinent side effects    Check blood pressure and heart rate at home a few times per week- keep a log with dates and times and bring to office visit   Regular exercise and following a healthy diet encouraged   Follow up with me in 8 weeks to review testing     Scribe's attestation: This note was scribed in the presence of Dr. Abdullahi Nelson M.D. By José Manuel Anand RN    The scribes documentation has been prepared under my direction and personally reviewed by me in its entirety. I confirm that the note above accurately reflects all work, treatment, procedures, and medical decision making performed by me. Dr. Abdullahi Nelson MD      Thank you for allowing me to participate in the care of this individual.      Amanda Muller.  López Abbasi M.D., Zoila Ruelas

## 2021-11-19 NOTE — PROGRESS NOTES
[address]   Cardiac Consultation    Referring Provider:  Gala De Guzman MD     Chief Complaint   Patient presents with    New Patient    Palpitations      Macey Sierra   1960    History of Present Illness:    Macey Sierra is a 62 y.o. female who is here today as a NEW patient consult for cardiology, referred by Gala De Guzman MD for palpitations. She has a past medical history of asthma,  hyperlipidemia, ascending aorta aneurysmal 3.5 in 2009, 3.9 10/2021. CT calcium score 10/2021 was 54. Today she states that 3 weeks ago she started to have palpitations which felt like her heart was pounding. She was talking to her neighbor at the time and had to sit in the grass because she felt like she may pass out. This spell lasted for a total of 3 days on and off. She had another episode of feeling like she may pass out with her heart pounding at the time. Feels like hard beats but also a faster heart rate. She states she has not checked her heart rate during these events. She has a family history of heart disease in her brothers one who had recently had stent placement. She states she had her symptoms while wearing her monitor but not as severe. She has chest pain which feels like a tightness which started years ago. The pain is seperate from her palpitations. She thinks the pain may be related to her posture. Not exertional. No assoc SOB. Unchanged for years. Lasts minutes. She has fatigue with exercise especially in the warmer weather. Patient currently denies any weight gain, edema, shortness of breath, and syncope. Past Medical History:   has a past medical history of Atrophic vaginitis, Gastroesophageal reflux disease without esophagitis, and RAD (reactive airway disease), mild intermittent, uncomplicated. Surgical History:   has a past surgical history that includes Colonoscopy (4/6/16) and Foot surgery (Left, 03/01/2018).      Social History:   reports that she has never smoked. She has never used smokeless tobacco. She reports current alcohol use of about 2.0 standard drinks of alcohol per week. She reports that she does not use drugs. Family History:  family history includes Alzheimer's Disease in her maternal grandmother and mother; Arthritis in her father, maternal grandmother, and mother; Breast Cancer in her maternal cousin; Cancer in her father and maternal grandfather; Heart Attack (age of onset: 72) in her paternal uncle; High Blood Pressure in her maternal grandfather and mother; No Known Problems in her paternal grandfather and paternal grandmother; Stroke in her maternal uncle; Thyroid Disease in her mother. Home Medications:  Prior to Admission medications    Medication Sig Start Date End Date Taking? Authorizing Provider   rosuvastatin (CRESTOR) 10 MG tablet Take 1 tablet by mouth nightly 10/19/21  Yes Stephani Gaffney MD   albuterol sulfate HFA (PROAIR HFA) 108 (90 Base) MCG/ACT inhaler INHALE 2 PUFFS INTO THE LUNGS 4 TIMES DAILY AS NEEDED FOR WHEEZING 9/13/21  Yes Stephani Gaffney MD   montelukast (SINGULAIR) 10 MG tablet TAKE 1 TABLET BY MOUTH EVERY DAY  Patient taking differently: as needed TAKE 1 TABLET BY MOUTH EVERY DAY 6/14/21  Yes Stephani Gaffney MD   omeprazole (PRILOSEC) 20 MG delayed release capsule TAKE 1 CAPSULE BY MOUTH EVERY DAY 6/14/21  Yes Stephani Gaffney MD   Cholecalciferol (VITAMIN D3) 2000 UNITS CAPS Take by mouth daily    Yes Historical Provider, MD   Multiple Vitamin (MULTIVITAMIN PO) Take by mouth daily    Yes Historical Provider, MD        Allergies:  Patient has no known allergies. Review of Systems:   · Constitutional: there has been no unanticipated weight loss. There's been no change in energy level, sleep pattern, or activity level. · Eyes: No visual changes or diplopia. No scleral icterus. · ENT: No Headaches, hearing loss or vertigo. No mouth sores or sore throat.   · Cardiovascular: Reviewed in HPI  · Respiratory: No cough or wheezing, no sputum production. No hematemesis. · Gastrointestinal: No abdominal pain, appetite loss, blood in stools. No change in bowel or bladder habits. · Genitourinary: No dysuria, trouble voiding, or hematuria. · Musculoskeletal:  No gait disturbance, weakness or joint complaints. · Integumentary: No rash or pruritis. · Neurological: No headache, diplopia, change in muscle strength, numbness or tingling. No change in gait, balance, coordination, mood, affect, memory, mentation, behavior. · Psychiatric: No anxiety, no depression. · Endocrine: No malaise, fatigue or temperature intolerance. No excessive thirst, fluid intake, or urination. No tremor. · Hematologic/Lymphatic: No abnormal bruising or bleeding, blood clots or swollen lymph nodes. · Allergic/Immunologic: No nasal congestion or hives. Physical Examination:    Vitals:    11/19/21 1325   BP: 110/60   Pulse: 89   SpO2: 98%        Constitutional and General Appearance: NAD   Respiratory:  · Normal excursion and expansion without use of accessory muscles  · Resp Auscultation: Normal breath sounds without dullness  Cardiovascular:  · The apical impulses not displaced  · Heart tones are crisp and normal  · Cervical veins are not engorged  · The carotid upstroke is normal in amplitude and contour without delay or bruit  · Normal S1S2, No S3, No Murmur  · Peripheral pulses are symmetrical and full  · There is no clubbing, cyanosis of the extremities. · No edema  · Femoral Arteries: 2+ and equal  · Pedal Pulses: 2+ and equal   Abdomen:  · No masses or tenderness  · Liver/Spleen: No Abnormalities Noted  Neurological/Psychiatric:  · Alert and oriented in all spheres  · Moves all extremities well  · Exhibits normal gait balance and coordination  · No abnormalities of mood, affect, memory, mentation, or behavior are noted    Echocardiogram 2009  EF 55-60%       24 Hour Holter monitor 11/1/2021  The rhythm was sinus.  Average NC interval 0.16 average QRS duration 0.08.2. Occasional PVC's and ventricular couplets, rare PAC. 3, Short 6 beat run PAT4. Assessment:  Coronary artery disease- noted on CT calcium score 10/2021   Chest pain - atypical   Palpitations - possible arrhythmia   Dizziness/near syncope   Hyperlipidemia - statin just started   History of ascending aorta aneurysmal 3.5 in 2009, 3.9 by CT 10/2021   COVID + last September   Family history of heart diease in brothers   History of various vein surgery      Plan:  Asprin 81 mg daily - R/B/A/E discussed    Stress echocardiogram  Vascular screening test   Recommend a 30 day cardiac event monitor to be mailed to your home   Plan to repeat CT of the chest without contrast in 1 year   Cardiac medications reviewed including indications and pertinent side effects    Check blood pressure and heart rate at home a few times per week- keep a log with dates and times and bring to office visit   Regular exercise and following a healthy diet encouraged   Follow up with me in 8 weeks to review testing     Scribe's attestation: This note was scribed in the presence of Dr. Ari Styles M.D. By Bernadette Sosa RN    The scribes documentation has been prepared under my direction and personally reviewed by me in its entirety. I confirm that the note above accurately reflects all work, treatment, procedures, and medical decision making performed by me. Dr. Ari Styles MD      Thank you for allowing me to participate in the care of this individual.      Isatu Tubbs.  Ankur Larson M.D., Alison Boswell

## 2021-11-19 NOTE — PATIENT INSTRUCTIONS
Plan:  Recommend starting an Asprin 81 mg daily due to coronary artery disease noted on CT calcium score and also family history of heart diease   Recommend a stress echocardiogram to evaluate chest pain   Recommend a 30 day cardiac event monitor to be mailed to your home   Plan to repeat CT of the chest without contrast in 1 year   Cardiac medications reviewed including indications and pertinent side effects    Check blood pressure and heart rate at home a few times per week- keep a log with dates and times and bring to office visit   Regular exercise and following a healthy diet encouraged   Follow up with me in 8 weeks to review testing   Your provider has ordered testing for further evaluation. An order/prescription has been included in your paper work.  To schedule outpatient testing, contact Central Scheduling by calling Rayn ([phone]).

## 2021-12-08 ENCOUNTER — HOSPITAL ENCOUNTER (OUTPATIENT)
Dept: CARDIOLOGY | Age: 61
Discharge: HOME | End: 2021-12-08
Payer: MEDICAID

## 2021-12-08 DIAGNOSIS — R07.2 PRECORDIAL PAIN: ICD-10-CM

## 2021-12-08 DIAGNOSIS — Z82.49 FAMILY HISTORY OF HEART DISEASE: ICD-10-CM

## 2021-12-08 DIAGNOSIS — R00.2 PALPITATIONS: ICD-10-CM

## 2021-12-08 LAB
LV EF: 60 %
LVEF MODALITY: NORMAL

## 2021-12-08 PROCEDURE — 93351 STRESS TTE COMPLETE: CPT

## 2021-12-08 PROCEDURE — 93320 DOPPLER ECHO COMPLETE: CPT

## 2021-12-08 NOTE — PROGRESS NOTES
Patient arrived to stress lab for echo stress test.  Patient was educated on procedure, all questions answered, and consent verified/obtained.

## 2021-12-09 ENCOUNTER — TELEPHONE (OUTPATIENT)
Dept: CARDIOLOGY CLINIC | Age: 61
End: 2021-12-09

## 2021-12-09 NOTE — TELEPHONE ENCOUNTER
----- Message from Kacy Meeks MD sent at 12/9/2021  1:42 PM EST -----  Call  Echo looks good  Heart fxn normal

## 2022-04-28 ENCOUNTER — OFFICE VISIT (OUTPATIENT)
Dept: INTERNAL MEDICINE CLINIC | Age: 62
End: 2022-04-28
Payer: COMMERCIAL

## 2022-04-28 VITALS
WEIGHT: 188 LBS | HEART RATE: 84 BPM | OXYGEN SATURATION: 94 % | DIASTOLIC BLOOD PRESSURE: 80 MMHG | HEIGHT: 64 IN | SYSTOLIC BLOOD PRESSURE: 126 MMHG | BODY MASS INDEX: 32.1 KG/M2

## 2022-04-28 DIAGNOSIS — R10.31 RIGHT LOWER QUADRANT ABDOMINAL PAIN: Primary | ICD-10-CM

## 2022-04-28 PROCEDURE — 99213 OFFICE O/P EST LOW 20 MIN: CPT | Performed by: INTERNAL MEDICINE

## 2022-04-28 NOTE — PATIENT INSTRUCTIONS
[facility] (8 locations):  [phone] dermatology [phone] [address] [facility] Dermatology [phone]   Dermatology ([facility])  [phone] and [facility] Dermatology - Jt Eduardo MD  [facility], [facility], [address]  Ph: [phone]

## 2022-04-28 NOTE — PROGRESS NOTES
Danielle Hall  YOB: 1960    Date of Service:  4/28/2022    Chief Complaint:      Chief Complaint   Patient presents with    Flank Pain     right side intermittent flank pain x 2 weeks worse with eating and drinking. Assessment/Plan:  Kendra Schulz was seen today for flank pain. Diagnoses and all orders for this visit:    Right lower quadrant abdominal pain    Try to increase fluids, vegetables and ambulation to lower gas in intestine    Return keep fasting Physical 6/6 or move to earlier if still having pain. HPI:  Danielle Hall is a 62 y.o. She complain of right side pain intermittently lasting about a minute for couple of weeks. She's had bowel movements daily and not constipated.   The 10-year ASCVD risk score (Radha Driscoll, et al., 2013) is: 3.5%    Values used to calculate the score:      Age: 62 years      Sex: Female      Is Non- : No      Diabetic: No      Tobacco smoker: No      Systolic Blood Pressure: 527 mmHg      Is BP treated: No      HDL Cholesterol: 58 mg/dL      Total Cholesterol: 234 mg/dL    No results found for: LABA1C, LABMICR  Lab Results   Component Value Date     06/14/2021    K 4.6 06/14/2021     06/14/2021    CO2 26 06/14/2021    BUN 16 06/14/2021    CREATININE 0.6 06/14/2021    GLUCOSE 94 06/14/2021    CALCIUM 9.1 06/14/2021     Lab Results   Component Value Date    CHOL 234 06/14/2021    TRIG 109 06/14/2021    HDL 58 06/14/2021    HDL 65 10/13/2011    LDLCALC 154 06/14/2021     Lab Results   Component Value Date    ALT 14 06/14/2021    AST 15 06/14/2021     Lab Results   Component Value Date    TSH 3.84 06/02/2020    TSH 2.33 01/12/2016     Lab Results   Component Value Date    WBC 4.1 06/14/2021    HGB 13.1 06/14/2021    HCT 38.4 06/14/2021    MCV 96.4 06/14/2021     06/14/2021     No results found for: INR   No results found for: PSA   No results found for: [facility]     Patient Active Problem List Diagnosis    Ganglion cyst of left foot    Bunion, left    Dermatitis    Gastroesophageal reflux disease without esophagitis    RAD (reactive airway disease), mild intermittent, uncomplicated    Asthma due to environmental allergies    Precordial pain       No Known Allergies  Outpatient Medications Marked as Taking for the 4/28/22 encounter (Office Visit) with Gilberto Gaffney MD   Medication Sig Dispense Refill    montelukast (SINGULAIR) 10 MG tablet TAKE 1 TABLET BY MOUTH EVERY DAY 30 tablet 6    omeprazole (PRILOSEC) 20 MG delayed release capsule TAKE 1 CAPSULE BY MOUTH EVERY DAY 30 capsule 6    albuterol sulfate HFA (PROAIR HFA) 108 (90 Base) MCG/ACT inhaler INHALE 2 PUFFS INTO THE LUNGS 4 TIMES DAILY AS NEEDED FOR WHEEZING 1 each 0    Cholecalciferol (VITAMIN D3) 2000 UNITS CAPS Take by mouth daily       Multiple Vitamin (MULTIVITAMIN PO) Take by mouth daily            Review of Systems: 14 systems were negative except of what was stated on HPI    Nursing note and vitals reviewed. Vitals:    04/28/22 0906   BP: 126/80   Pulse: 84   SpO2: 94%   Weight: 188 lb (85.3 kg)   Height: 5' 4\" (1.626 m)     Wt Readings from Last 3 Encounters:   04/28/22 188 lb (85.3 kg)   11/19/21 188 lb 6.4 oz (85.5 kg)   10/27/21 188 lb (85.3 kg)     BP Readings from Last 3 Encounters:   04/28/22 126/80   11/19/21 110/60   10/27/21 126/80     Body mass index is 32.27 kg/m². Constitutional: Patient appears well-developed and well-nourished. No distress. Head: Normocephalic and atraumatic. Neck: Normal range of motion. Neck supple. No thyroidmegaly. Cardiovascular: Normal rate, regular rhythm, normal heart sounds and intact distal pulses. Pulmonary/Chest: Effort normal and breath sounds normal. No stridor. No respiratory distress. No wheezes and no rales. Abdominal: Soft. Bowel sounds are normal. No distension and no mass. No tenderness. No rebound and no guarding. Musculoskeletal: No edema and no tenderness. Skin: No rash or erythema.

## 2022-05-18 ENCOUNTER — HOSPITAL ENCOUNTER (OUTPATIENT)
Dept: WOMENS IMAGING | Age: 62
Discharge: HOME | End: 2022-05-18
Payer: COMMERCIAL

## 2022-05-18 VITALS — WEIGHT: 185 LBS | HEIGHT: 64 IN | BODY MASS INDEX: 31.58 KG/M2

## 2022-05-18 DIAGNOSIS — Z12.31 ENCOUNTER FOR SCREENING MAMMOGRAM FOR BREAST CANCER: ICD-10-CM

## 2022-05-18 PROCEDURE — 77063 BREAST TOMOSYNTHESIS BI: CPT

## 2022-06-16 ENCOUNTER — OFFICE VISIT (OUTPATIENT)
Dept: INTERNAL MEDICINE CLINIC | Age: 62
End: 2022-06-16
Payer: COMMERCIAL

## 2022-06-16 VITALS
DIASTOLIC BLOOD PRESSURE: 72 MMHG | OXYGEN SATURATION: 98 % | SYSTOLIC BLOOD PRESSURE: 102 MMHG | HEART RATE: 84 BPM | WEIGHT: 183 LBS | HEIGHT: 64 IN | BODY MASS INDEX: 31.24 KG/M2

## 2022-06-16 DIAGNOSIS — E78.2 MIXED HYPERLIPIDEMIA: ICD-10-CM

## 2022-06-16 DIAGNOSIS — J45.909 ASTHMA DUE TO ENVIRONMENTAL ALLERGIES: ICD-10-CM

## 2022-06-16 DIAGNOSIS — Z00.00 ENCOUNTER FOR WELL ADULT EXAM WITHOUT ABNORMAL FINDINGS: Primary | ICD-10-CM

## 2022-06-16 DIAGNOSIS — K21.9 GASTROESOPHAGEAL REFLUX DISEASE WITHOUT ESOPHAGITIS: ICD-10-CM

## 2022-06-16 PROCEDURE — 99396 PREV VISIT EST AGE 40-64: CPT | Performed by: INTERNAL MEDICINE

## 2022-06-16 SDOH — ECONOMIC STABILITY: FOOD INSECURITY: WITHIN THE PAST 12 MONTHS, THE FOOD YOU BOUGHT JUST DIDN'T LAST AND YOU DIDN'T HAVE MONEY TO GET MORE.: NEVER TRUE

## 2022-06-16 SDOH — ECONOMIC STABILITY: FOOD INSECURITY: WITHIN THE PAST 12 MONTHS, YOU WORRIED THAT YOUR FOOD WOULD RUN OUT BEFORE YOU GOT MONEY TO BUY MORE.: NEVER TRUE

## 2022-06-16 ASSESSMENT — PATIENT HEALTH QUESTIONNAIRE - PHQ9
2. FEELING DOWN, DEPRESSED OR HOPELESS: 0
SUM OF ALL RESPONSES TO PHQ9 QUESTIONS 1 & 2: 0
SUM OF ALL RESPONSES TO PHQ QUESTIONS 1-9: 0
1. LITTLE INTEREST OR PLEASURE IN DOING THINGS: 0
SUM OF ALL RESPONSES TO PHQ QUESTIONS 1-9: 0

## 2022-06-16 ASSESSMENT — SOCIAL DETERMINANTS OF HEALTH (SDOH): HOW HARD IS IT FOR YOU TO PAY FOR THE VERY BASICS LIKE FOOD, HOUSING, MEDICAL CARE, AND HEATING?: NOT HARD AT ALL

## 2022-06-16 NOTE — PATIENT INSTRUCTIONS
Well Visit, Women 48 to 72: Care Instructions  Overview     Well visits can help you stay healthy. Your doctor has checked your overall health and may have suggested ways to take good care of yourself. Your doctor also may have recommended tests. At home, you can help prevent illness withhealthy eating, regular exercise, and other steps. Follow-up care is a key part of your treatment and safety. Be sure to make and go to all appointments, and call your doctor if you are having problems. It's also a good idea to know your test results and keep alist of the medicines you take. How can you care for yourself at home?  Get screening tests that you and your doctor decide on. Screening helps find diseases before any symptoms appear.  Eat healthy foods. Choose fruits, vegetables, whole grains, protein, and low-fat dairy foods. Limit fat, especially saturated fat. Reduce salt in your diet.  Limit alcohol. Have no more than 1 drink a day or 7 drinks a week.  Get at least 30 minutes of exercise on most days of the week. Walking is a good choice. You also may want to do other activities, such as running, swimming, cycling, or playing tennis or team sports.  Reach and stay at a healthy weight. This will lower your risk for many problems, such as obesity, diabetes, heart disease, and high blood pressure.  Do not smoke. Smoking can make health problems worse. If you need help quitting, talk to your doctor about stop-smoking programs and medicines. These can increase your chances of quitting for good.  Care for your mental health. It is easy to get weighed down by worry and stress. Learn strategies to manage stress, like deep breathing and mindfulness, and stay connected with your family and community. If you find you often feel sad or hopeless, talk with your doctor. Treatment can help.  Talk to your doctor about whether you have any risk factors for sexually transmitted infections (STIs).  You can help prevent STIs if you wait to have sex with a new partner (or partners) until you've each been tested for STIs. It also helps if you use condoms (male or female condoms) and if you limit your sex partners to one person who only has sex with you. Vaccines are available for some STIs.  If you think you may have a problem with alcohol or drug use, talk to your doctor. This includes prescription medicines (such as amphetamines and opioids) and illegal drugs (such as cocaine and methamphetamine). Your doctor can help you figure out what type of treatment is best for you.  Protect your skin from too much sun. When you're outdoors from 10 a.m. to 4 p.m., stay in the shade or cover up with clothing and a hat with a wide brim. Wear sunglasses that block UV rays. Even when it's cloudy, put broad-spectrum sunscreen (SPF 30 or higher) on any exposed skin.  See a dentist one or two times a year for checkups and to have your teeth cleaned.  Wear a seat belt in the car. When should you call for help? Watch closely for changes in your health, and be sure to contact your doctor if you have any problems or symptoms that concern you. Where can you learn more? Go to [email or web address] and sign in to your [employer] account. Enter M473 in the [employer] box to learn more about \"Well Visit, Women 50 to 72: Care Instructions. \"     If you do not have an account, please click on the \"Sign Up Now\" link. Current as of: October 6, 2021               Content Version: 13.2  © 2006-2022 Healthwise, Incorporated. Care instructions adapted under license by [facility] ([facility]). If you have questions about a medical condition or this instruction, always ask your healthcare professional. [place] [address] any warranty or liability for your use of this information.

## 2022-06-20 ENCOUNTER — NURSE ONLY (OUTPATIENT)
Dept: INTERNAL MEDICINE CLINIC | Age: 62
End: 2022-06-20

## 2022-06-20 DIAGNOSIS — Z00.00 ENCOUNTER FOR WELL ADULT EXAM WITHOUT ABNORMAL FINDINGS: ICD-10-CM

## 2022-06-20 LAB
A/G RATIO: 1.6 (ref 1.1–2.2)
ALBUMIN SERPL-MCNC: 4.1 G/DL (ref 3.4–5)
ALP BLD-CCNC: 117 U/L (ref 40–129)
ALT SERPL-CCNC: 13 U/L (ref 10–40)
ANION GAP SERPL CALCULATED.3IONS-SCNC: 12 MMOL/L (ref 3–16)
AST SERPL-CCNC: 15 U/L (ref 15–37)
BASOPHILS ABSOLUTE: 0 K/UL (ref 0–0.2)
BASOPHILS RELATIVE PERCENT: 0.7 %
BILIRUB SERPL-MCNC: 0.3 MG/DL (ref 0–1)
BUN BLDV-MCNC: 12 MG/DL (ref 7–20)
CALCIUM SERPL-MCNC: 9.4 MG/DL (ref 8.3–10.6)
CHLORIDE BLD-SCNC: 105 MMOL/L (ref 99–110)
CHOLESTEROL, TOTAL: 205 MG/DL (ref 0–199)
CO2: 26 MMOL/L (ref 21–32)
CREAT SERPL-MCNC: 0.7 MG/DL (ref 0.6–1.2)
EOSINOPHILS ABSOLUTE: 0.2 K/UL (ref 0–0.6)
EOSINOPHILS RELATIVE PERCENT: 2.7 %
GFR AFRICAN AMERICAN: >60
GFR NON-AFRICAN AMERICAN: >60
GLUCOSE BLD-MCNC: 82 MG/DL (ref 70–99)
HCT VFR BLD CALC: 39 % (ref 36–48)
HDLC SERPL-MCNC: 52 MG/DL (ref 40–60)
HEMOGLOBIN: 13 G/DL (ref 12–16)
LDL CHOLESTEROL CALCULATED: 125 MG/DL
LYMPHOCYTES ABSOLUTE: 1.6 K/UL (ref 1–5.1)
LYMPHOCYTES RELATIVE PERCENT: 27.4 %
MCH RBC QN AUTO: 31.5 PG (ref 26–34)
MCHC RBC AUTO-ENTMCNC: 33.3 G/DL (ref 31–36)
MCV RBC AUTO: 94.7 FL (ref 80–100)
MONOCYTES ABSOLUTE: 0.7 K/UL (ref 0–1.3)
MONOCYTES RELATIVE PERCENT: 10.9 %
NEUTROPHILS ABSOLUTE: 3.5 K/UL (ref 1.7–7.7)
NEUTROPHILS RELATIVE PERCENT: 58.3 %
PDW BLD-RTO: 13.2 % (ref 12.4–15.4)
PLATELET # BLD: 259 K/UL (ref 135–450)
PMV BLD AUTO: 9 FL (ref 5–10.5)
POTASSIUM SERPL-SCNC: 4.9 MMOL/L (ref 3.5–5.1)
RBC # BLD: 4.12 M/UL (ref 4–5.2)
SODIUM BLD-SCNC: 143 MMOL/L (ref 136–145)
TOTAL PROTEIN: 6.6 G/DL (ref 6.4–8.2)
TRIGL SERPL-MCNC: 140 MG/DL (ref 0–150)
VLDLC SERPL CALC-MCNC: 28 MG/DL
WBC # BLD: 6 K/UL (ref 4–11)

## 2022-08-23 ENCOUNTER — TELEMEDICINE (OUTPATIENT)
Dept: INTERNAL MEDICINE CLINIC | Age: 62
End: 2022-08-23
Payer: COMMERCIAL

## 2022-08-23 DIAGNOSIS — J02.9 PHARYNGITIS, UNSPECIFIED ETIOLOGY: Primary | ICD-10-CM

## 2022-08-23 PROCEDURE — 99213 OFFICE O/P EST LOW 20 MIN: CPT | Performed by: INTERNAL MEDICINE

## 2022-08-23 PROCEDURE — 3017F COLORECTAL CA SCREEN DOC REV: CPT | Performed by: INTERNAL MEDICINE

## 2022-08-23 PROCEDURE — G8427 DOCREV CUR MEDS BY ELIG CLIN: HCPCS | Performed by: INTERNAL MEDICINE

## 2022-08-23 RX ORDER — AMOXICILLIN AND CLAVULANATE POTASSIUM 875; 125 MG/1; MG/1
1 TABLET, FILM COATED ORAL 2 TIMES DAILY
Qty: 14 TABLET | Refills: 0 | Status: SHIPPED | OUTPATIENT
Start: 2022-08-23 | End: 2022-08-30

## 2022-08-23 NOTE — PROGRESS NOTES
Pursuant to the emergency declaration under the [facility], [facility] waiver authority and the [employer] Act, this Virtual  Video Visit was conducted, with patient's consent, to reduce the patient's risk of exposure to COVID-19 and provide continuity of care. Service is  provided through a video synchronous discussion virtually to substitute for in-person clinic visit with the patient being at home and Dr. Kristina Davis being at home. Patient consent to the video visit. Date of Service:  8/23/2022    Chief Complaint:      Chief Complaint   Patient presents with    Pharyngitis    Generalized Body Aches       Assessment/Plan:    Shankar Kasper was seen today for pharyngitis and generalized body aches. Diagnoses and all orders for this visit:    Pharyngitis, unspecified etiology  -     amoxicillin-clavulanate (AUGMENTIN) 875-125 MG per tablet; Take 1 tablet by mouth 2 times daily for 7 days      Return if symptoms worsen or fail to improve. HPI:  Radha Campos is a 62 y.o. She complain of body aches, fever, chills, sore throat and headache for 3 days. She had a negative Covid 19 home test yesterday. Her daughter was just diagnose with strep today at the [facility]  She has been around her daughter for the past week wedding shopping.     No results found for: LABA1C, LABMICR  Lab Results   Component Value Date     06/20/2022    K 4.9 06/20/2022     06/20/2022    CO2 26 06/20/2022    BUN 12 06/20/2022    CREATININE 0.7 06/20/2022    GLUCOSE 82 06/20/2022    CALCIUM 9.4 06/20/2022     Lab Results   Component Value Date/Time    CHOL 205 06/20/2022 07:46 AM    TRIG 140 06/20/2022 07:46 AM    HDL 52 06/20/2022 07:46 AM    HDL 65 10/13/2011 11:37 AM    LDLCALC 125 06/20/2022 07:46 AM     Lab Results   Component Value Date    ALT 13 06/20/2022    AST 15 06/20/2022     Lab Results   Component Value Date    TSH 3.84 06/02/2020    TSH 2.33 01/12/2016     Lab Results   Component Value Date    WBC 6.0 06/20/2022    HGB 13.0 06/20/2022    HCT 39.0 06/20/2022    MCV 94.7 06/20/2022     06/20/2022     No results found for: INR   No results found for: PSA   No results found for: [facility]     Patient Active Problem List   Diagnosis    Ganglion cyst of left foot    Bunion, left    Dermatitis    Gastroesophageal reflux disease without esophagitis    RAD (reactive airway disease), mild intermittent, uncomplicated    Asthma due to environmental allergies    Precordial pain       No Known Allergies  Outpatient Medications Marked as Taking for the 8/23/22 encounter (Telemedicine) with Sawyer Gaffney MD   Medication Sig Dispense Refill    rosuvastatin (CRESTOR) 10 MG tablet TAKE 1 TABLET BY MOUTH EVERY DAY AT NIGHT 30 tablet 5    montelukast (SINGULAIR) 10 MG tablet TAKE 1 TABLET BY MOUTH EVERY DAY 30 tablet 6    omeprazole (PRILOSEC) 20 MG delayed release capsule TAKE 1 CAPSULE BY MOUTH EVERY DAY 30 capsule 6    albuterol sulfate HFA (PROAIR HFA) 108 (90 Base) MCG/ACT inhaler INHALE 2 PUFFS INTO THE LUNGS 4 TIMES DAILY AS NEEDED FOR WHEEZING 1 each 0    Cholecalciferol (VITAMIN D3) 2000 UNITS CAPS Take by mouth daily      Multiple Vitamin (MULTIVITAMIN PO) Take by mouth daily           Review of Systems: 14 systems were negative except of what was stated on HPI    Nursing note and vitals reviewed. There were no vitals filed for this visit. Wt Readings from Last 3 Encounters:   06/16/22 183 lb (83 kg)   05/18/22 185 lb (83.9 kg)   04/28/22 188 lb (85.3 kg)     BP Readings from Last 3 Encounters:   06/16/22 102/72   04/28/22 126/80   11/19/21 110/60     There is no height or weight on file to calculate BMI. Constitutional: Patient appears well-developed and well-nourished. No distress. Head: Normocephalic and atraumatic. Psychiatric: Normal mood and affect.  Behavior is normal.

## 2023-01-27 ENCOUNTER — TELEPHONE (OUTPATIENT)
Dept: CARDIOLOGY CLINIC | Age: 63
End: 2023-01-27

## 2023-01-27 DIAGNOSIS — R00.2 PALPITATION: Primary | ICD-10-CM

## 2023-01-27 NOTE — TELEPHONE ENCOUNTER
Monitor order placed in chart. LMOM for pt to contact the office. Please schedule a monitor placement appt when she calls back.

## 2023-01-27 NOTE — TELEPHONE ENCOUNTER
Called SCAR cardenas for a call to the office. Would like to know if she is still having palpitations? If so how often? Any SOB or dizziness with the palpations?

## 2023-01-27 NOTE — TELEPHONE ENCOUNTER
[facility] [place]21. Pt did not schedule testing at that time. Now patient thinks she should get this done. Does [facility] still want pt to wear. If so need new order.  TY

## 2023-01-27 NOTE — TELEPHONE ENCOUNTER
Pt returned call. Pt stated that the palpitations come and go. Pt stated that she feels a pounding in her chest when they begin and they she checks her heart rate with her apple watch. Pt stated that her heart rate on 1/27 was 210. Pt does not get sob or dizzy with the palpitations. Pt stated that anxiety does run in her family and is not sure if its related. Pt would like to know if [facility] would like her to wear a heart monitor. Please advise.

## 2023-01-31 ENCOUNTER — NURSE ONLY (OUTPATIENT)
Dept: CARDIOLOGY CLINIC | Age: 63
End: 2023-01-31

## 2023-01-31 NOTE — TELEPHONE ENCOUNTER
Spoke with patient. She had a [facility] 14 day monitor placed today @ [facility]. She will bring the monitor back to the [place] location at the end of the 14 days. I gave her my name in case she had any further questions.

## 2023-01-31 NOTE — PROGRESS NOTES
14 day CAM placed on patient for tachycardia feeling per Dr. Dyan Reed. [id number]  Patient advised to return monitor to [facility] as this is closer to her home.

## 2023-02-08 ENCOUNTER — TELEPHONE (OUTPATIENT)
Dept: CARDIOLOGY CLINIC | Age: 63
End: 2023-02-08

## 2023-02-08 NOTE — TELEPHONE ENCOUNTER
Pt presented herself in office today. She says her bardy monitor stopped sticking and fell off today. She says she is supposed to wear the monitor until next Tuesday. I took off the device piece that was on pt original monitor and placed it on a new sticker for pt to finish up monitoring.

## 2023-02-26 PROCEDURE — 93248 EXT ECG>7D<15D REV&INTERPJ: CPT | Performed by: INTERNAL MEDICINE

## 2023-03-13 ENCOUNTER — TELEPHONE (OUTPATIENT)
Dept: CARDIOLOGY CLINIC | Age: 63
End: 2023-03-13

## 2023-03-14 NOTE — TELEPHONE ENCOUNTER
Pt returned call. Message given. V/U. Pt stated that she has read that anxiety can cause pvc's along with caffeine. Pt is going to f/u up with pcp first and will call mhi back to schedule an ov with [facility] if needed.

## 2023-03-14 NOTE — TELEPHONE ENCOUNTER
Call  Monitor shows extra heart beats called PVCs.  Not a seig arrhythmia but likeluy causing the symptoms  Sched OV to discuss - next avail, no add-on (failed to f/u 1 year ago as advised)

## 2023-04-17 DIAGNOSIS — I49.9 IRREGULAR HEART BEAT: Primary | ICD-10-CM

## 2023-05-23 ENCOUNTER — OFFICE VISIT (OUTPATIENT)
Dept: OBGYN CLINIC | Age: 63
End: 2023-05-23
Payer: COMMERCIAL

## 2023-05-23 VITALS
BODY MASS INDEX: 32.89 KG/M2 | HEART RATE: 88 BPM | WEIGHT: 191.6 LBS | SYSTOLIC BLOOD PRESSURE: 110 MMHG | DIASTOLIC BLOOD PRESSURE: 68 MMHG | TEMPERATURE: 97.3 F

## 2023-05-23 DIAGNOSIS — Z12.31 ENCOUNTER FOR SCREENING MAMMOGRAM FOR MALIGNANT NEOPLASM OF BREAST: ICD-10-CM

## 2023-05-23 DIAGNOSIS — Z12.4 SCREENING FOR CERVICAL CANCER: ICD-10-CM

## 2023-05-23 DIAGNOSIS — Z11.3 ROUTINE SCREENING FOR STI (SEXUALLY TRANSMITTED INFECTION): ICD-10-CM

## 2023-05-23 DIAGNOSIS — Z01.419 WOMEN'S ANNUAL ROUTINE GYNECOLOGICAL EXAMINATION: Primary | ICD-10-CM

## 2023-05-23 PROCEDURE — 99396 PREV VISIT EST AGE 40-64: CPT | Performed by: OBSTETRICS & GYNECOLOGY

## 2023-05-23 RX ORDER — PHENOL 1.4 %
20 AEROSOL, SPRAY (ML) MUCOUS MEMBRANE NIGHTLY PRN
COMMUNITY

## 2023-05-23 ASSESSMENT — SOCIAL DETERMINANTS OF HEALTH (SDOH)

## 2023-05-23 ASSESSMENT — ENCOUNTER SYMPTOMS
CONSTIPATION: 0
DIARRHEA: 0
BACK PAIN: 1
BLOOD IN STOOL: 0

## 2023-05-23 NOTE — PROGRESS NOTES
Labia:         Right: No tenderness or lesion. Left: No tenderness or lesion. Vagina: No vaginal discharge, tenderness or bleeding. Cervix: No discharge, friability or lesion. Uterus: Normal.       Adnexa:         Right: No mass or tenderness. Left: No mass or tenderness. Musculoskeletal:      Cervical back: Normal range of motion and neck supple. Skin:     General: Skin is warm and dry. Neurological:      General: No focal deficit present. Mental Status: She is alert and oriented to person, place, and time. Psychiatric:         Mood and Affect: Mood normal.         Behavior: Behavior normal.          Assessment/Plan:  65 y.o. [id number] presenting for her annual exam:     Diagnosis Orders   1. Women's annual routine gynecological examination        2. Screening for cervical cancer  PAP SMEAR      3. Encounter for screening mammogram for malignant neoplasm of breast  PRACHI DIGITAL SCREEN W OR WO CAD BILATERAL      4. Routine screening for STI (sexually transmitted infection)  VAGINAL PATHOGENS PROBE *A    C.trachomatis N.gonorrhoeae DNA           Annual Visit Recommendations:   Self-breast exam and self-breast awareness reviewed. Pelvic exam was done and ASCCP guidelines reviewed. PAP smear with results to follow. Cultures sent with results to follow   Order for mammogram placed. Reviewed healthy diet and daily multivitamin use. Reviewed recommendations on Calcium and Vitamin D intake. Follow Up  - Will call patient with results   -No follow-ups on file.      Zenaida Polo DO

## 2023-05-24 LAB
CANDIDA DNA VAG QL NAA+PROBE: NORMAL
G VAGINALIS DNA SPEC QL NAA+PROBE: NORMAL
T VAGINALIS DNA VAG QL NAA+PROBE: NORMAL

## 2023-05-25 LAB
C TRACH DNA CVX QL NAA+PROBE: NEGATIVE
N GONORRHOEA DNA CERV MUCUS QL NAA+PROBE: NEGATIVE

## 2023-05-26 ENCOUNTER — HOSPITAL ENCOUNTER (OUTPATIENT)
Dept: WOMENS IMAGING | Age: 63
Discharge: HOME | End: 2023-05-26
Attending: OBSTETRICS & GYNECOLOGY
Payer: COMMERCIAL

## 2023-05-26 DIAGNOSIS — Z12.31 ENCOUNTER FOR SCREENING MAMMOGRAM FOR MALIGNANT NEOPLASM OF BREAST: ICD-10-CM

## 2023-05-26 LAB
HPV HR 12 DNA SPEC QL NAA+PROBE: NOT DETECTED
HPV16 DNA SPEC QL NAA+PROBE: NOT DETECTED
HPV16+18+H RISK 12 DNA SPEC-IMP: NORMAL
HPV18 DNA SPEC QL NAA+PROBE: NOT DETECTED

## 2023-05-26 PROCEDURE — 77063 BREAST TOMOSYNTHESIS BI: CPT

## 2023-06-26 ENCOUNTER — OFFICE VISIT (OUTPATIENT)
Dept: INTERNAL MEDICINE CLINIC | Age: 63
End: 2023-06-26
Payer: COMMERCIAL

## 2023-06-26 VITALS
SYSTOLIC BLOOD PRESSURE: 126 MMHG | HEART RATE: 86 BPM | OXYGEN SATURATION: 98 % | HEIGHT: 64 IN | DIASTOLIC BLOOD PRESSURE: 68 MMHG | WEIGHT: 195 LBS | BODY MASS INDEX: 33.29 KG/M2

## 2023-06-26 DIAGNOSIS — E56.9 VITAMIN DEFICIENCY: ICD-10-CM

## 2023-06-26 DIAGNOSIS — Z23 NEED FOR PROPHYLACTIC VACCINATION WITH TETANUS-DIPHTHERIA (TD): ICD-10-CM

## 2023-06-26 DIAGNOSIS — Z00.00 ENCOUNTER FOR WELL ADULT EXAM WITHOUT ABNORMAL FINDINGS: Primary | ICD-10-CM

## 2023-06-26 PROCEDURE — 99396 PREV VISIT EST AGE 40-64: CPT | Performed by: INTERNAL MEDICINE

## 2023-06-26 PROCEDURE — 90471 IMMUNIZATION ADMIN: CPT | Performed by: INTERNAL MEDICINE

## 2023-06-26 PROCEDURE — 90715 TDAP VACCINE 7 YRS/> IM: CPT | Performed by: INTERNAL MEDICINE

## 2023-06-26 RX ORDER — PHENOL 1.4 %
1 AEROSOL, SPRAY (ML) MUCOUS MEMBRANE DAILY
COMMUNITY

## 2023-06-26 SDOH — ECONOMIC STABILITY: HOUSING INSECURITY
IN THE LAST 12 MONTHS, WAS THERE A TIME WHEN YOU DID NOT HAVE A STEADY PLACE TO SLEEP OR SLEPT IN A SHELTER (INCLUDING NOW)?: NO

## 2023-06-26 SDOH — ECONOMIC STABILITY: INCOME INSECURITY: HOW HARD IS IT FOR YOU TO PAY FOR THE VERY BASICS LIKE FOOD, HOUSING, MEDICAL CARE, AND HEATING?: NOT HARD AT ALL

## 2023-06-26 SDOH — ECONOMIC STABILITY: FOOD INSECURITY: WITHIN THE PAST 12 MONTHS, THE FOOD YOU BOUGHT JUST DIDN'T LAST AND YOU DIDN'T HAVE MONEY TO GET MORE.: NEVER TRUE

## 2023-06-26 SDOH — ECONOMIC STABILITY: FOOD INSECURITY: WITHIN THE PAST 12 MONTHS, YOU WORRIED THAT YOUR FOOD WOULD RUN OUT BEFORE YOU GOT MONEY TO BUY MORE.: NEVER TRUE

## 2023-06-26 ASSESSMENT — PATIENT HEALTH QUESTIONNAIRE - PHQ9
SUM OF ALL RESPONSES TO PHQ QUESTIONS 1-9: 0
SUM OF ALL RESPONSES TO PHQ QUESTIONS 1-9: 0
1. LITTLE INTEREST OR PLEASURE IN DOING THINGS: 0
2. FEELING DOWN, DEPRESSED OR HOPELESS: NOT AT ALL
SUM OF ALL RESPONSES TO PHQ QUESTIONS 1-9: 0
SUM OF ALL RESPONSES TO PHQ QUESTIONS 1-9: 0
2. FEELING DOWN, DEPRESSED OR HOPELESS: 0
SUM OF ALL RESPONSES TO PHQ9 QUESTIONS 1 & 2: 0
1. LITTLE INTEREST OR PLEASURE IN DOING THINGS: NOT AT ALL
SUM OF ALL RESPONSES TO PHQ9 QUESTIONS 1 & 2: 0

## 2023-06-28 ENCOUNTER — NURSE ONLY (OUTPATIENT)
Dept: INTERNAL MEDICINE CLINIC | Age: 63
End: 2023-06-28

## 2023-06-28 DIAGNOSIS — E56.9 VITAMIN DEFICIENCY: ICD-10-CM

## 2023-06-28 DIAGNOSIS — Z00.00 ENCOUNTER FOR WELL ADULT EXAM WITHOUT ABNORMAL FINDINGS: ICD-10-CM

## 2023-06-28 LAB
BASOPHILS # BLD: 0.1 K/UL (ref 0–0.2)
BASOPHILS NFR BLD: 0.9 %
DEPRECATED RDW RBC AUTO: 14.4 % (ref 12.4–15.4)
EOSINOPHIL # BLD: 0.1 K/UL (ref 0–0.6)
EOSINOPHIL NFR BLD: 2.5 %
HCT VFR BLD AUTO: 43.2 % (ref 36–48)
HGB BLD-MCNC: 14.3 G/DL (ref 12–16)
LYMPHOCYTES # BLD: 1.4 K/UL (ref 1–5.1)
LYMPHOCYTES NFR BLD: 23.8 %
MCH RBC QN AUTO: 31.6 PG (ref 26–34)
MCHC RBC AUTO-ENTMCNC: 33.2 G/DL (ref 31–36)
MCV RBC AUTO: 95.2 FL (ref 80–100)
MONOCYTES # BLD: 0.6 K/UL (ref 0–1.3)
MONOCYTES NFR BLD: 9.8 %
NEUTROPHILS # BLD: 3.8 K/UL (ref 1.7–7.7)
NEUTROPHILS NFR BLD: 63 %
PLATELET # BLD AUTO: 277 K/UL (ref 135–450)
PMV BLD AUTO: 9.3 FL (ref 5–10.5)
RBC # BLD AUTO: 4.54 M/UL (ref 4–5.2)
WBC # BLD AUTO: 6.1 K/UL (ref 4–11)

## 2023-06-29 LAB
25(OH)D3 SERPL-MCNC: 39.5 NG/ML
ALBUMIN SERPL-MCNC: 4.7 G/DL (ref 3.4–5)
ALBUMIN/GLOB SERPL: 1.7 {RATIO} (ref 1.1–2.2)
ALP SERPL-CCNC: 138 U/L (ref 40–129)
ALT SERPL-CCNC: 17 U/L (ref 10–40)
ANION GAP SERPL CALCULATED.3IONS-SCNC: 10 MMOL/L (ref 3–16)
AST SERPL-CCNC: 18 U/L (ref 15–37)
BILIRUB SERPL-MCNC: 0.4 MG/DL (ref 0–1)
BUN SERPL-MCNC: 13 MG/DL (ref 7–20)
CALCIUM SERPL-MCNC: 9.6 MG/DL (ref 8.3–10.6)
CHLORIDE SERPL-SCNC: 99 MMOL/L (ref 99–110)
CHOLEST SERPL-MCNC: 259 MG/DL (ref 0–199)
CO2 SERPL-SCNC: 29 MMOL/L (ref 21–32)
CREAT SERPL-MCNC: 0.8 MG/DL (ref 0.6–1.2)
GFR SERPLBLD CREATININE-BSD FMLA CKD-EPI: >60 ML/MIN/{1.73_M2}
GLUCOSE SERPL-MCNC: 90 MG/DL (ref 70–99)
HDLC SERPL-MCNC: 67 MG/DL (ref 40–60)
LDLC SERPL CALC-MCNC: 164 MG/DL
POTASSIUM SERPL-SCNC: 4.5 MMOL/L (ref 3.5–5.1)
PROT SERPL-MCNC: 7.5 G/DL (ref 6.4–8.2)
SODIUM SERPL-SCNC: 138 MMOL/L (ref 136–145)
TRIGL SERPL-MCNC: 142 MG/DL (ref 0–150)
VLDLC SERPL CALC-MCNC: 28 MG/DL

## 2023-09-07 PROBLEM — E78.00 HYPERCHOLESTEREMIA: Status: ACTIVE | Noted: 2023-09-07

## 2023-11-06 ENCOUNTER — OFFICE VISIT (OUTPATIENT)
Dept: INTERNAL MEDICINE CLINIC | Age: 63
End: 2023-11-06
Payer: COMMERCIAL

## 2023-11-06 ENCOUNTER — TELEPHONE (OUTPATIENT)
Dept: INTERNAL MEDICINE CLINIC | Age: 63
End: 2023-11-06

## 2023-11-06 ENCOUNTER — HOSPITAL ENCOUNTER (OUTPATIENT)
Dept: VASCULAR LAB | Age: 63
Discharge: HOME | End: 2023-11-06
Payer: COMMERCIAL

## 2023-11-06 VITALS
HEIGHT: 64 IN | DIASTOLIC BLOOD PRESSURE: 70 MMHG | WEIGHT: 193 LBS | SYSTOLIC BLOOD PRESSURE: 100 MMHG | OXYGEN SATURATION: 100 % | BODY MASS INDEX: 32.95 KG/M2 | HEART RATE: 95 BPM

## 2023-11-06 DIAGNOSIS — I82.432 POPLITEAL VEIN THROMBOSIS, LEFT (HCC): ICD-10-CM

## 2023-11-06 DIAGNOSIS — M79.605 LEFT LEG PAIN: ICD-10-CM

## 2023-11-06 DIAGNOSIS — I83.93 VARICOSE VEINS OF BOTH LOWER EXTREMITIES, UNSPECIFIED WHETHER COMPLICATED: ICD-10-CM

## 2023-11-06 DIAGNOSIS — I82.402 ACUTE DEEP VEIN THROMBOSIS (DVT) OF LEFT LOWER EXTREMITY, UNSPECIFIED VEIN (HCC): Primary | ICD-10-CM

## 2023-11-06 PROCEDURE — G8417 CALC BMI ABV UP PARAM F/U: HCPCS | Performed by: INTERNAL MEDICINE

## 2023-11-06 PROCEDURE — G8484 FLU IMMUNIZE NO ADMIN: HCPCS | Performed by: INTERNAL MEDICINE

## 2023-11-06 PROCEDURE — 93971 EXTREMITY STUDY: CPT

## 2023-11-06 PROCEDURE — G8427 DOCREV CUR MEDS BY ELIG CLIN: HCPCS | Performed by: INTERNAL MEDICINE

## 2023-11-06 PROCEDURE — 3017F COLORECTAL CA SCREEN DOC REV: CPT | Performed by: INTERNAL MEDICINE

## 2023-11-06 PROCEDURE — 99214 OFFICE O/P EST MOD 30 MIN: CPT | Performed by: INTERNAL MEDICINE

## 2023-11-06 PROCEDURE — 1036F TOBACCO NON-USER: CPT | Performed by: INTERNAL MEDICINE

## 2023-11-06 NOTE — PROGRESS NOTES
Pawan Conde  YOB: 1960    Date of Service:  11/6/2023    Chief Complaint:      Chief Complaint   Patient presents with    Leg Pain     Lt lower leg pain, onset 3 weeks ago, worse after driving home from [place] 10-31-23. Taking 2-3 ibuprofen daily with slight improvement       Assessment/Plan:  Kelly Garrido was seen today for leg pain. Diagnoses and all orders for this visit:    Acute deep vein thrombosis (DVT) of left lower extremity, unspecified vein (HCC)  -     apixaban starter pack (ELIQUIS DVT/PE STARTER PACK) 5 MG TBPK tablet; Take 1 tablet by mouth See Admin Instructions    Popliteal vein thrombosis, left (HCC)  -     apixaban starter pack (ELIQUIS DVT/PE STARTER PACK) 5 MG TBPK tablet; Take 1 tablet by mouth See Admin Instructions  Referral to Dr. Catracho Perez for further evaluation since it's mobile. Left leg pain  -     VL Extremity Venous Left; Future    Varicose veins of both lower extremities, unspecified whether complicated      Return 1 month on Left DVT. HPI:  Pawan Conde is a 59 y.o. She complain of painful varicose vein that started 2 weeks ago after driving 11 hrs and got worse when she drove back 11 hrs a week ago. She deny any trauma, shortness of breath, chest pain or on hormones. No family history or personal history of DVT or phlebitis.     No results found for: \"LABA1C\"  Lab Results   Component Value Date     06/28/2023    K 4.5 06/28/2023    CL 99 06/28/2023    CO2 29 06/28/2023    BUN 13 06/28/2023    CREATININE 0.8 06/28/2023    GLUCOSE 90 06/28/2023    CALCIUM 9.6 06/28/2023     Lab Results   Component Value Date/Time    CHOL 259 06/28/2023 08:57 AM    TRIG 142 06/28/2023 08:57 AM    HDL 67 06/28/2023 08:57 AM    HDL 65 10/13/2011 11:37 AM    LDLCALC 164 06/28/2023 08:57 AM     Lab Results   Component Value Date    ALT 17 06/28/2023    AST 18 06/28/2023     Lab Results   Component Value Date    TSH 3.84 06/02/2020    TSH 2.33 01/12/2016     Lab

## 2023-11-06 NOTE — TELEPHONE ENCOUNTER
Marisela calling with preliminary results on venous doppler. Positive for popliteal vein, loosely attached. Positive - gastrocnemius vein Lt knee/mid calf-Superficial venous thrombosis. Spoke with ,-She will send Rx for Eliquis, -patient to try coupon for co-pay card. -[facility]  Stop aspirin as soon as she receives the Eliquis, schedule one month f/up with .  will call patient regarding results. Le Purcell at the lab @ [facility] these instructions for patient. Patient still there and was given instructions verbally.  She will go to [facility] on her way home, she will wait to hear from .

## 2023-11-06 NOTE — ADDENDUM NOTE
Addended by: SILVINO BUSTAMANTE on: 11/6/2023 03:58 PM     Modules accepted: Orders, Level of Service

## 2023-11-09 DIAGNOSIS — I82.432 POPLITEAL VEIN THROMBOSIS, LEFT (HCC): Primary | ICD-10-CM

## 2023-11-09 DIAGNOSIS — I82.402 ACUTE DEEP VEIN THROMBOSIS (DVT) OF LEFT LOWER EXTREMITY, UNSPECIFIED VEIN (HCC): ICD-10-CM

## 2023-11-15 ENCOUNTER — TELEPHONE (OUTPATIENT)
Dept: VASCULAR SURGERY | Age: 63
End: 2023-11-15

## 2023-11-15 NOTE — TELEPHONE ENCOUNTER
Called patient and left a message regarding Friday apt. Need to reschedule as Dr Rocío Murrell had to add on a surgery. Can move apt to next week. pamlr

## 2023-11-20 ENCOUNTER — OFFICE VISIT (OUTPATIENT)
Dept: VASCULAR SURGERY | Age: 63
End: 2023-11-20
Payer: COMMERCIAL

## 2023-11-20 VITALS
HEIGHT: 64 IN | WEIGHT: 196 LBS | SYSTOLIC BLOOD PRESSURE: 140 MMHG | DIASTOLIC BLOOD PRESSURE: 82 MMHG | BODY MASS INDEX: 33.46 KG/M2

## 2023-11-20 DIAGNOSIS — I82.432: Primary | ICD-10-CM

## 2023-11-20 DIAGNOSIS — I82.812 ACUTE SUPERFICIAL VENOUS THROMBOSIS OF LOWER EXTREMITY, LEFT: ICD-10-CM

## 2023-11-20 PROCEDURE — G8427 DOCREV CUR MEDS BY ELIG CLIN: HCPCS | Performed by: SURGERY

## 2023-11-20 PROCEDURE — G8484 FLU IMMUNIZE NO ADMIN: HCPCS | Performed by: SURGERY

## 2023-11-20 PROCEDURE — 99243 OFF/OP CNSLTJ NEW/EST LOW 30: CPT | Performed by: SURGERY

## 2023-11-20 PROCEDURE — G8417 CALC BMI ABV UP PARAM F/U: HCPCS | Performed by: SURGERY

## 2023-11-20 NOTE — PROGRESS NOTES
Outpatient Consultation / H&P    Date of Consultation:  11/20/2023    PCP:  Miesha Gaffney MD     Referring Provider:  Dr. Elpidio Zamudio     Chief Complaint:   Chief Complaint   Patient presents with    Other     Patient ref by Diana Meredith MD for dvt popliteal vein. pamlr        History of Present Illness: We are asked to see this patient in consultation by Dr. Elpidio Zamudio regarding DVT and SVT. Lawrence Saez is a 59 y.o. female who developed leg pain after long car drive to [place]. She says she never really got much leg swelling. Venous duplex was performed showing both superficial and deep venous thrombosis. The popliteal vein thrombus did show some mobility. She is currently on Eliquis. Past Medical History:  Past Medical History:   Diagnosis Date    Atrophic vaginitis     Eczema     Gastroesophageal reflux disease without esophagitis 12/04/2018    Hypercholesteremia     Hypercholesteremia 9/7/2023    Overactive bladder     RAD (reactive airway disease), mild intermittent, uncomplicated 75/30/6011       Past Surgical History:  Past Surgical History:   Procedure Laterality Date    COLONOSCOPY  4/6/16    polyps    FOOT SURGERY Left 03/01/2018       Home Medications:   Prior to Admission medications    Medication Sig Start Date End Date Taking?  Authorizing Provider   apixaban starter pack (ELIQUIS DVT/PE STARTER PACK) 5 MG TBPK tablet Take 1 tablet by mouth See Admin Instructions 11/6/23  Yes Miesha Gaffney MD   calcium carbonate 600 MG TABS tablet Take 1 tablet by mouth daily   Yes Linda Stevens MD   Melatonin 10 MG TABS Take 20 mg by mouth nightly as needed   Yes Linda Stevens MD   diphenhydrAMINE HCl, Sleep, 25 MG CAPS Take 50 mg by mouth nightly as needed   Yes Linda Stevens MD   Cholecalciferol (VITAMIN D3) 2000 UNITS CAPS Take by mouth daily   Yes Linda Stevens MD   Multiple Vitamin (MULTIVITAMIN PO) Take by mouth daily   Yes Linda Stevens MD

## 2023-12-05 DIAGNOSIS — I82.402 ACUTE DEEP VEIN THROMBOSIS (DVT) OF LEFT LOWER EXTREMITY, UNSPECIFIED VEIN (HCC): ICD-10-CM

## 2023-12-05 DIAGNOSIS — I82.432 POPLITEAL VEIN THROMBOSIS, LEFT (HCC): ICD-10-CM

## 2023-12-05 DIAGNOSIS — I82.402 ACUTE DEEP VEIN THROMBOSIS (DVT) OF LEFT LOWER EXTREMITY, UNSPECIFIED VEIN (HCC): Primary | ICD-10-CM

## 2023-12-05 RX ORDER — APIXABAN 5 MG (74)
KIT ORAL
Qty: 74 EACH | OUTPATIENT
Start: 2023-12-05

## 2023-12-05 NOTE — TELEPHONE ENCOUNTER
LAST REFILL 11/06/2023  AMOUNT 74     0 REFILLS  LAST VISIT 11/06/2023  NEXT VISIT 12/06/2023    Patient will be out of medication prior to appt.

## 2023-12-06 ENCOUNTER — OFFICE VISIT (OUTPATIENT)
Dept: INTERNAL MEDICINE CLINIC | Age: 63
End: 2023-12-06
Payer: COMMERCIAL

## 2023-12-06 VITALS
WEIGHT: 193 LBS | HEIGHT: 64 IN | HEART RATE: 89 BPM | DIASTOLIC BLOOD PRESSURE: 72 MMHG | OXYGEN SATURATION: 98 % | BODY MASS INDEX: 32.95 KG/M2 | SYSTOLIC BLOOD PRESSURE: 106 MMHG

## 2023-12-06 DIAGNOSIS — I82.402 ACUTE DEEP VEIN THROMBOSIS (DVT) OF LEFT LOWER EXTREMITY, UNSPECIFIED VEIN (HCC): Primary | ICD-10-CM

## 2023-12-06 DIAGNOSIS — I82.432 POPLITEAL VEIN THROMBOSIS, LEFT (HCC): ICD-10-CM

## 2023-12-06 PROCEDURE — 1036F TOBACCO NON-USER: CPT | Performed by: INTERNAL MEDICINE

## 2023-12-06 PROCEDURE — 3017F COLORECTAL CA SCREEN DOC REV: CPT | Performed by: INTERNAL MEDICINE

## 2023-12-06 PROCEDURE — 99213 OFFICE O/P EST LOW 20 MIN: CPT | Performed by: INTERNAL MEDICINE

## 2023-12-06 PROCEDURE — G8427 DOCREV CUR MEDS BY ELIG CLIN: HCPCS | Performed by: INTERNAL MEDICINE

## 2023-12-06 PROCEDURE — G8484 FLU IMMUNIZE NO ADMIN: HCPCS | Performed by: INTERNAL MEDICINE

## 2023-12-06 PROCEDURE — G8417 CALC BMI ABV UP PARAM F/U: HCPCS | Performed by: INTERNAL MEDICINE

## 2023-12-06 NOTE — PROGRESS NOTES
Hitesh Wan  YOB: 1960    Date of Service:  12/6/2023    Chief Complaint:      Chief Complaint   Patient presents with    DVT      Follow up         Assessment/Plan:  Liseth Dimas was seen today for dvt . Diagnoses and all orders for this visit:    Acute deep vein thrombosis (DVT) of left lower extremity, unspecified vein (HCC)    Popliteal vein thrombosis, left (HCC)    Stable and continue on current medications. Return keep Fasting Physical 6/27. HPI:  Hitesh Wan is a 59 y.o. Acute DVT left LE and popliteal vein thrombosis:  stable on Eliquis 5 mg bid and no more pain. No chest pain or shortness of breath.     No results found for: \"LABA1C\"  Lab Results   Component Value Date     06/28/2023    K 4.5 06/28/2023    CL 99 06/28/2023    CO2 29 06/28/2023    BUN 13 06/28/2023    CREATININE 0.8 06/28/2023    GLUCOSE 90 06/28/2023    CALCIUM 9.6 06/28/2023     Lab Results   Component Value Date/Time    CHOL 259 06/28/2023 08:57 AM    TRIG 142 06/28/2023 08:57 AM    HDL 67 06/28/2023 08:57 AM    HDL 65 10/13/2011 11:37 AM    LDLCALC 164 06/28/2023 08:57 AM     Lab Results   Component Value Date    ALT 17 06/28/2023    AST 18 06/28/2023     Lab Results   Component Value Date    TSH 3.84 06/02/2020    TSH 2.33 01/12/2016     Lab Results   Component Value Date    WBC 6.1 06/28/2023    HGB 14.3 06/28/2023    HCT 43.2 06/28/2023    MCV 95.2 06/28/2023     06/28/2023     No results found for: \"INR\"   No results found for: \"PSA\"   No results found for: \"LABURIC\"     Patient Active Problem List   Diagnosis    Ganglion cyst of left foot    Bunion, left    Dermatitis    Gastroesophageal reflux disease without esophagitis    RAD (reactive airway disease), mild intermittent, uncomplicated    Asthma due to environmental allergies    Precordial pain    Hypercholesteremia    Varicose veins of both lower extremities       Allergies   Allergen Reactions    Seasonal      Outpatient

## 2024-02-19 ENCOUNTER — TELEMEDICINE (OUTPATIENT)
Dept: INTERNAL MEDICINE CLINIC | Age: 64
End: 2024-02-19
Payer: COMMERCIAL

## 2024-02-19 DIAGNOSIS — U07.1 COVID-19 VIRUS INFECTION: Primary | ICD-10-CM

## 2024-02-19 PROCEDURE — 3017F COLORECTAL CA SCREEN DOC REV: CPT | Performed by: INTERNAL MEDICINE

## 2024-02-19 PROCEDURE — G8427 DOCREV CUR MEDS BY ELIG CLIN: HCPCS | Performed by: INTERNAL MEDICINE

## 2024-02-19 PROCEDURE — 99213 OFFICE O/P EST LOW 20 MIN: CPT | Performed by: INTERNAL MEDICINE

## 2024-02-19 NOTE — PROGRESS NOTES
Patient was evaluated through a synchronous (real-time) video encounter. Patient identification was verified at the start of the visit. She (or guardian if applicable) is aware that this is a billable service, which includes applicable co-pays. This visit was conducted with the patient's (and/or legal guardian's) verbal consent. She has not had a related appointment within my department in the past 7 days or scheduled within the next 24 hours.   The patient was located at Home: [address] [place] [id number].  The provider was located at Facility (Appt Dept): [address] [place] [address], [address]  [place] [address].    Date of Service:  2/19/2024    Chief Complaint:      Chief Complaint   Patient presents with    Positive For Covid-19     2 days ago, tested positive    Generalized Body Aches    Cough     Productive cough today    Headache     congestion       Assessment/Plan:    Flaquita was seen today for positive for covid-19, generalized body aches, cough and headache.    Diagnoses and all orders for this visit:    COVID-19 virus infection  -     nirmatrelvir/ritonavir 300/100 (PAXLOVID, 300/100,) 20 x 150 MG & 10 x 100MG TBPK; Take 3 tablets (two 150 mg nirmatrelvir and one 100 mg ritonavir tablets) by mouth every 12 hours for 5 days.    If your cough is bad or you have shortness of breath, I have sent in an inhaler for you to use every 4-6 hours to help with your cough from bronchospasm triggered by the infection.  Use the inhaler around the clock until your cough is completely gone.  If you have a fever or muscle pain, you can use Tylenol or ibuprofen as needed.      You should quarantine yourself for at least 5 days and wear a  mask for 7-10 days if going out.  inform others who you have been around with of your results so they can also quarantine themselves or go get tested for COVID 19.  Keep hydrated, rest and can take Vit C daily if available.      Some people will continue to suffer residual effects from

## 2024-04-22 ENCOUNTER — TELEPHONE (OUTPATIENT)
Dept: CARDIOLOGY CLINIC | Age: 64
End: 2024-04-22

## 2024-04-22 ENCOUNTER — OFFICE VISIT (OUTPATIENT)
Dept: CARDIOLOGY CLINIC | Age: 64
End: 2024-04-22
Payer: COMMERCIAL

## 2024-04-22 VITALS
OXYGEN SATURATION: 98 % | DIASTOLIC BLOOD PRESSURE: 84 MMHG | WEIGHT: 198 LBS | HEIGHT: 64 IN | SYSTOLIC BLOOD PRESSURE: 138 MMHG | BODY MASS INDEX: 33.8 KG/M2 | HEART RATE: 84 BPM

## 2024-04-22 DIAGNOSIS — R00.2 PALPITATION: Primary | ICD-10-CM

## 2024-04-22 DIAGNOSIS — I25.10 CORONARY ARTERY DISEASE INVOLVING NATIVE CORONARY ARTERY OF NATIVE HEART WITHOUT ANGINA PECTORIS: ICD-10-CM

## 2024-04-22 DIAGNOSIS — E78.00 HYPERCHOLESTEREMIA: ICD-10-CM

## 2024-04-22 DIAGNOSIS — R00.2 PALPITATIONS: ICD-10-CM

## 2024-04-22 DIAGNOSIS — I71.21 ANEURYSM OF ASCENDING AORTA WITHOUT RUPTURE (HCC): ICD-10-CM

## 2024-04-22 PROCEDURE — 1036F TOBACCO NON-USER: CPT | Performed by: INTERNAL MEDICINE

## 2024-04-22 PROCEDURE — G8427 DOCREV CUR MEDS BY ELIG CLIN: HCPCS | Performed by: INTERNAL MEDICINE

## 2024-04-22 PROCEDURE — 93270 REMOTE 30 DAY ECG REV/REPORT: CPT | Performed by: INTERNAL MEDICINE

## 2024-04-22 PROCEDURE — 99214 OFFICE O/P EST MOD 30 MIN: CPT | Performed by: INTERNAL MEDICINE

## 2024-04-22 PROCEDURE — G8417 CALC BMI ABV UP PARAM F/U: HCPCS | Performed by: INTERNAL MEDICINE

## 2024-04-22 PROCEDURE — 93000 ELECTROCARDIOGRAM COMPLETE: CPT | Performed by: INTERNAL MEDICINE

## 2024-04-22 PROCEDURE — 3017F COLORECTAL CA SCREEN DOC REV: CPT | Performed by: INTERNAL MEDICINE

## 2024-04-22 RX ORDER — METOPROLOL SUCCINATE 25 MG/1
25 TABLET, EXTENDED RELEASE ORAL DAILY
Qty: 30 TABLET | Refills: 5 | Status: SHIPPED | OUTPATIENT
Start: 2024-04-22

## 2024-04-22 RX ORDER — EZETIMIBE 10 MG/1
10 TABLET ORAL DAILY
Qty: 30 TABLET | Refills: 5 | Status: SHIPPED | OUTPATIENT
Start: 2024-04-22

## 2024-04-22 NOTE — PATIENT INSTRUCTIONS
Plan:  ~CT chest with no contrast to evaluate TAA- call central scheduling at [phone] to schedule   ~Recommend starting enteric coated Aspirin 81 mg daily.   ~Start Zetia 10 mg daily    ~We discussed an injectable medication called Repatha. To start this prior authorization process, have fasting lipids, TSH, magnesium level CMP    ~We discussed an implanted monitor called a loop recorder   ~We will start with a 30 day cardiac event monitor.  ~Recommend referral to the EP team, (also called electrophysiology) in 8 weeks to discuss an implanted loop recorder if your cardiac monitor does not show any arrhythmias   ~Start Metoprolol Succinate, also called Toprol XL 25 mg daily   Cardiac medications reviewed including indications and pertinent side effects. Medication list updated at this visit.   Patient verbalizes understanding of the need for treatment and education has been provided at today's visit. Additional education material will be provided in after visit summary.    Check blood pressure and heart rate at home a few times per week- keep a log with dates and times and bring to office visit   Regular exercise and following a healthy diet encouraged   Follow up with me in 4 months

## 2024-04-22 NOTE — TELEPHONE ENCOUNTER
Monitor placed by KATHERINE RN  Monitor company [employer]  Length of monitor 28  Monitor ordered by [facility]  Serial number [id number]  Kit ID [id number]  Activation successful prior to pt leaving office? Yes

## 2024-04-22 NOTE — PROGRESS NOTES
Pressure in her maternal grandfather and mother; No Known Problems in her paternal grandfather and paternal grandmother; Stroke in her maternal uncle; Thyroid Disease in her mother.     Home Medications:  Prior to Admission medications    Medication Sig Start Date End Date Taking? Authorizing Provider   Probiotic Product (PRO-BIOTIC BLEND PO) Take by mouth   Yes Linda Stevens MD   apixaban (ELIQUIS) 5 MG TABS tablet Take 1 tablet by mouth 2 times daily 12/5/23  Yes Stephani Gaffney MD   calcium carbonate 600 MG TABS tablet Take 1 tablet by mouth daily   Yes Linda Stevens MD   Melatonin 10 MG TABS Take 20 mg by mouth nightly as needed   Yes Linda Stevens MD   diphenhydrAMINE HCl, Sleep, 25 MG CAPS Take 50 mg by mouth nightly as needed   Yes Linda Stevens MD   Cholecalciferol (VITAMIN D3) 2000 UNITS CAPS Take by mouth daily   Yes Linda Stevens MD   Multiple Vitamin (MULTIVITAMIN PO) Take by mouth daily   Yes Linda Stevens MD        Allergies:  Seasonal     Review of Systems:   Constitutional: there has been no unanticipated weight loss. There's been no change in energy level, sleep pattern, or activity level.     Eyes: No visual changes or diplopia. No scleral icterus.  ENT: No Headaches, hearing loss or vertigo. No mouth sores or sore throat.  Cardiovascular: Reviewed in HPI  Respiratory: No cough or wheezing, no sputum production. No hematemesis.    Gastrointestinal: No abdominal pain, appetite loss, blood in stools. No change in bowel or bladder habits.  Genitourinary: No dysuria, trouble voiding, or hematuria.  Musculoskeletal:  No gait disturbance, weakness or joint complaints.  Integumentary: No rash or pruritis.  Neurological: No headache, diplopia, change in muscle strength, numbness or tingling. No change in gait, balance, coordination, mood, affect, memory, mentation, behavior.  Psychiatric: No anxiety, no depression.  Endocrine: No malaise, fatigue or

## 2024-04-26 ENCOUNTER — TELEPHONE (OUTPATIENT)
Dept: CARDIOLOGY CLINIC | Age: 64
End: 2024-04-26

## 2024-04-26 ENCOUNTER — HOSPITAL ENCOUNTER (OUTPATIENT)
Age: 64
Discharge: HOME | End: 2024-04-26
Payer: COMMERCIAL

## 2024-04-26 DIAGNOSIS — I25.10 CORONARY ARTERY DISEASE INVOLVING NATIVE CORONARY ARTERY OF NATIVE HEART WITHOUT ANGINA PECTORIS: ICD-10-CM

## 2024-04-26 DIAGNOSIS — E78.00 HYPERCHOLESTEREMIA: ICD-10-CM

## 2024-04-26 LAB
ALBUMIN SERPL-MCNC: 4.3 G/DL (ref 3.4–5)
ALBUMIN/GLOB SERPL: 1.3 {RATIO} (ref 1.1–2.2)
ALP SERPL-CCNC: 126 U/L (ref 40–129)
ALT SERPL-CCNC: 113 U/L (ref 10–40)
ANION GAP SERPL CALCULATED.3IONS-SCNC: 9 MMOL/L (ref 3–16)
AST SERPL-CCNC: 61 U/L (ref 15–37)
BILIRUB SERPL-MCNC: 0.4 MG/DL (ref 0–1)
BUN SERPL-MCNC: 10 MG/DL (ref 7–20)
CALCIUM SERPL-MCNC: 9.4 MG/DL (ref 8.3–10.6)
CHLORIDE SERPL-SCNC: 104 MMOL/L (ref 99–110)
CHOLEST SERPL-MCNC: 210 MG/DL (ref 0–199)
CO2 SERPL-SCNC: 28 MMOL/L (ref 21–32)
CREAT SERPL-MCNC: 0.6 MG/DL (ref 0.6–1.2)
GFR SERPLBLD CREATININE-BSD FMLA CKD-EPI: >90 ML/MIN/{1.73_M2}
GLUCOSE SERPL-MCNC: 88 MG/DL (ref 70–99)
HDLC SERPL-MCNC: 52 MG/DL (ref 40–60)
LDLC SERPL CALC-MCNC: 131 MG/DL
POTASSIUM SERPL-SCNC: 4.3 MMOL/L (ref 3.5–5.1)
PROT SERPL-MCNC: 7.5 G/DL (ref 6.4–8.2)
SODIUM SERPL-SCNC: 141 MMOL/L (ref 136–145)
TRIGL SERPL-MCNC: 135 MG/DL (ref 0–150)
VLDLC SERPL CALC-MCNC: 27 MG/DL

## 2024-04-26 PROCEDURE — 36415 COLL VENOUS BLD VENIPUNCTURE: CPT

## 2024-04-26 PROCEDURE — 80053 COMPREHEN METABOLIC PANEL: CPT

## 2024-04-26 PROCEDURE — 80061 LIPID PANEL: CPT

## 2024-04-26 NOTE — TELEPHONE ENCOUNTER
Called and spoke with patient, she is agreeable to start Repatha. Repatha PA started.     pt saw lab results on MyChart and noticed her ALT and AST were high, she would like to know why they were elevated and if she should be concerned. Please advise

## 2024-04-26 NOTE — RESULT ENCOUNTER NOTE
Please notify patient that their lab results confirm high chol  Please initiate Repatha PA as discussed at OV

## 2024-04-26 NOTE — TELEPHONE ENCOUNTER
Don't know why liver enzymes elevated. Should d/w PCP  Meantime, would not start the zetia as can effect liver enzymes as well

## 2024-04-26 NOTE — TELEPHONE ENCOUNTER
----- Message from John Garrison MD sent at 4/26/2024 12:57 PM EDT -----  Please notify patient that their lab results confirm high chol  Please initiate Repatha PA as discussed at OV

## 2024-04-29 ENCOUNTER — TELEMEDICINE (OUTPATIENT)
Dept: INTERNAL MEDICINE CLINIC | Age: 64
End: 2024-04-29
Payer: COMMERCIAL

## 2024-04-29 DIAGNOSIS — R79.89 ELEVATED LFTS: Primary | ICD-10-CM

## 2024-04-29 DIAGNOSIS — I82.402 ACUTE DEEP VEIN THROMBOSIS (DVT) OF LEFT LOWER EXTREMITY, UNSPECIFIED VEIN (HCC): ICD-10-CM

## 2024-04-29 PROCEDURE — G8427 DOCREV CUR MEDS BY ELIG CLIN: HCPCS | Performed by: INTERNAL MEDICINE

## 2024-04-29 PROCEDURE — 99213 OFFICE O/P EST LOW 20 MIN: CPT | Performed by: INTERNAL MEDICINE

## 2024-04-29 PROCEDURE — 3017F COLORECTAL CA SCREEN DOC REV: CPT | Performed by: INTERNAL MEDICINE

## 2024-04-29 ASSESSMENT — PATIENT HEALTH QUESTIONNAIRE - PHQ9
SUM OF ALL RESPONSES TO PHQ QUESTIONS 1-9: 0
SUM OF ALL RESPONSES TO PHQ9 QUESTIONS 1 & 2: 0
SUM OF ALL RESPONSES TO PHQ QUESTIONS 1-9: 0
2. FEELING DOWN, DEPRESSED OR HOPELESS: NOT AT ALL
1. LITTLE INTEREST OR PLEASURE IN DOING THINGS: NOT AT ALL

## 2024-04-29 NOTE — PROGRESS NOTES
04/26/2024 08:16 AM    HDL 65 10/13/2011 11:37 AM    LDLCALC 131 04/26/2024 08:16 AM     Lab Results   Component Value Date     (H) 04/26/2024    AST 61 (H) 04/26/2024     Lab Results   Component Value Date    TSH 3.84 06/02/2020    TSH 2.33 01/12/2016     Lab Results   Component Value Date    WBC 6.1 06/28/2023    HGB 14.3 06/28/2023    HCT 43.2 06/28/2023    MCV 95.2 06/28/2023     06/28/2023     No results found for: \"INR\"   No results found for: \"PSA\"   No results found for: \"LABURIC\"     Patient Active Problem List   Diagnosis    Ganglion cyst of left foot    Bunion, left    Dermatitis    Gastroesophageal reflux disease without esophagitis    RAD (reactive airway disease), mild intermittent, uncomplicated    Asthma due to environmental allergies    Precordial pain    Hypercholesteremia    Varicose veins of both lower extremities    Coronary artery disease involving native coronary artery of native heart without angina pectoris    Palpitations       Allergies   Allergen Reactions    Crestor [Rosuvastatin]     Seasonal     Statins      Outpatient Medications Marked as Taking for the 4/29/24 encounter (Telemedicine) with Stephani Gaffney MD   Medication Sig Dispense Refill    Probiotic Product (PRO-BIOTIC BLEND PO) Take by mouth      metoprolol succinate (TOPROL XL) 25 MG extended release tablet Take 1 tablet by mouth daily 30 tablet 5    ezetimibe (ZETIA) 10 MG tablet Take 1 tablet by mouth daily 30 tablet 5    apixaban (ELIQUIS) 5 MG TABS tablet Take 1 tablet by mouth 2 times daily 180 tablet 1    calcium carbonate 600 MG TABS tablet Take 1 tablet by mouth daily      Melatonin 10 MG TABS Take 20 mg by mouth nightly as needed      diphenhydrAMINE HCl, Sleep, 25 MG CAPS Take 50 mg by mouth nightly as needed      Cholecalciferol (VITAMIN D3) 2000 UNITS CAPS Take by mouth daily      Multiple Vitamin (MULTIVITAMIN PO) Take by mouth daily           Review of Systems: 14 systems were negative except of what

## 2024-05-08 ENCOUNTER — TELEPHONE (OUTPATIENT)
Dept: CARDIOLOGY CLINIC | Age: 64
End: 2024-05-08

## 2024-05-08 NOTE — TELEPHONE ENCOUNTER
PA for Repatha was approved. Called and left VM for patient to return call to office. Please ask which pharmacy she would like Repatha sent to. ([facility] is not contracted with her insurance plan)

## 2024-05-24 ENCOUNTER — TELEPHONE (OUTPATIENT)
Dept: CARDIOLOGY CLINIC | Age: 64
End: 2024-05-24

## 2024-05-24 ENCOUNTER — HOSPITAL ENCOUNTER (OUTPATIENT)
Dept: CT IMAGING | Age: 64
Discharge: HOME | End: 2024-05-24
Attending: INTERNAL MEDICINE
Payer: COMMERCIAL

## 2024-05-24 DIAGNOSIS — I71.21 ANEURYSM OF ASCENDING AORTA WITHOUT RUPTURE (HCC): ICD-10-CM

## 2024-05-24 PROCEDURE — 71250 CT THORAX DX C-: CPT

## 2024-05-24 NOTE — TELEPHONE ENCOUNTER
----- Message from John Garrison MD sent at 5/24/2024  8:39 AM EDT -----  Please notify patient that their CT shows aneurysm a little bigger since CT 2021. From 3.9 cm to 4.2 cm. Still just need to monitor  Repeat CT 1 year  Also noted some small nodules in lungs that are likely from prior infections but should d/w PCP

## 2024-05-24 NOTE — TELEPHONE ENCOUNTER
Created telephone encounter. Spoke with Flaquita relayed message per [facility] regarding [place]. Pt verbalized understanding and will contact PCP.

## 2024-06-13 PROCEDURE — 93272 ECG/REVIEW INTERPRET ONLY: CPT | Performed by: INTERNAL MEDICINE

## 2024-06-17 ENCOUNTER — TELEPHONE (OUTPATIENT)
Dept: CARDIOLOGY CLINIC | Age: 64
End: 2024-06-17

## 2024-06-17 DIAGNOSIS — I25.10 CORONARY ARTERY DISEASE INVOLVING NATIVE CORONARY ARTERY OF NATIVE HEART WITHOUT ANGINA PECTORIS: ICD-10-CM

## 2024-06-17 DIAGNOSIS — R00.2 PALPITATIONS: ICD-10-CM

## 2024-06-21 NOTE — PROGRESS NOTES
Assessment:     1. Palpitations/supraventricular tachycardia: Patient with recurrent episodes of palpitations.  An event monitor captured more than 200 episodes of SVT with rates at times exceeding 200 bpm.  The longest episode was just over 10 minutes in duration.  Patient gets quite symptomatic with the longer episodes.  She has some dizziness associated with the longer episodes.  Denies any chest pain or shortness of breath.  Has not had any syncope.  No clear triggering factors.  She is able to manage most episodes with deep breathing and sitting down.  She has been on low-dose metoprolol.  No evidence of ventricular preexcitation on resting ECG.  Appearance of SVT is that of narrow complex tachycardia.  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    Concern about weight gain and decreased metabolic rate on beta-blocker especially with an elevated BMI.    I had a discussion with the patient about the etiology of SVT and the different types that can occur. I also discussed with her treatment options including pharmacologic and nonpharmacologic interventions. We discussed the benefits, risks and alternatives of each option. Regarding the option of radiofrequency ablation, we discussed current success rates and the possible risks associated with the procedure (including the risks of bleeding, infection, vascular injury, injury to the normal conduction system of the heart possibly requiring pacemaker implant in rare cases, other risks to cardiac structures, and the very infrequent risks of stroke or danger to life). The patient had an opportunity to ask questions and was satisfied with explanations. Decided to proceed with the option of ablation.  The procedure will be scheduled in the near future.  Hold metoprolol 3 days prior to procedure.    2. Good blood pressure control    Plan:     Discussed the risks and

## 2024-06-24 ENCOUNTER — OFFICE VISIT (OUTPATIENT)
Dept: CARDIOLOGY CLINIC | Age: 64
End: 2024-06-24
Payer: COMMERCIAL

## 2024-06-24 ENCOUNTER — TELEPHONE (OUTPATIENT)
Dept: CARDIOLOGY CLINIC | Age: 64
End: 2024-06-24

## 2024-06-24 VITALS
SYSTOLIC BLOOD PRESSURE: 128 MMHG | DIASTOLIC BLOOD PRESSURE: 78 MMHG | HEIGHT: 64 IN | OXYGEN SATURATION: 99 % | HEART RATE: 72 BPM | BODY MASS INDEX: 33.38 KG/M2 | WEIGHT: 195.5 LBS

## 2024-06-24 DIAGNOSIS — R00.2 PALPITATIONS: ICD-10-CM

## 2024-06-24 DIAGNOSIS — I47.10 SVT (SUPRAVENTRICULAR TACHYCARDIA) (HCC): Primary | ICD-10-CM

## 2024-06-24 DIAGNOSIS — R42 DIZZINESS: ICD-10-CM

## 2024-06-24 DIAGNOSIS — I25.10 CORONARY ARTERY DISEASE INVOLVING NATIVE CORONARY ARTERY OF NATIVE HEART WITHOUT ANGINA PECTORIS: ICD-10-CM

## 2024-06-24 PROCEDURE — G8417 CALC BMI ABV UP PARAM F/U: HCPCS | Performed by: INTERNAL MEDICINE

## 2024-06-24 PROCEDURE — 93000 ELECTROCARDIOGRAM COMPLETE: CPT | Performed by: INTERNAL MEDICINE

## 2024-06-24 PROCEDURE — 1036F TOBACCO NON-USER: CPT | Performed by: INTERNAL MEDICINE

## 2024-06-24 PROCEDURE — 3017F COLORECTAL CA SCREEN DOC REV: CPT | Performed by: INTERNAL MEDICINE

## 2024-06-24 PROCEDURE — G8427 DOCREV CUR MEDS BY ELIG CLIN: HCPCS | Performed by: INTERNAL MEDICINE

## 2024-06-24 PROCEDURE — 99205 OFFICE O/P NEW HI 60 MIN: CPT | Performed by: INTERNAL MEDICINE

## 2024-06-24 ASSESSMENT — ENCOUNTER SYMPTOMS
HEMATEMESIS: 0
SHORTNESS OF BREATH: 1
RIGHT EYE: 0
LEFT EYE: 0
STRIDOR: 0
HEMATOCHEZIA: 0
WHEEZING: 0

## 2024-06-24 NOTE — TELEPHONE ENCOUNTER
Case request placed. Per [facility] please schedule this as the first procedure of the day so patient can be discharged that evening barring any complications. Thank you.     - Hold calcium carbonate, vitamin D3, multivitamin, and probiotic the morning of the procedure.   - Hold Metoprolol for three days prior to the procedure.

## 2024-06-24 NOTE — PATIENT INSTRUCTIONS
Plan:     Discussed the risks and benefits of an EP study and catheter ablation for SVT.   - Hold calcium carbonate, vitamin D3, multivitamin, and probiotic the morning of the procedure.   - Hold Metoprolol for three days prior to the procedure.   Maneuvers to attempt to stop an SVT episode  - Bear down and hold your breath as if you are having a bowel movement.  - Place ice on your neck, forehead, or hand.  - Massage the carotid artery on your neck for a few seconds (one side at a time).  - Blow into a straw.    Continue taking current cardiac medications as prescribed.   Follow up with me after the procedure.

## 2024-06-28 PROBLEM — I47.10 SVT (SUPRAVENTRICULAR TACHYCARDIA) (HCC): Status: ACTIVE | Noted: 2024-06-24

## 2024-06-28 NOTE — TELEPHONE ENCOUNTER
Pt returned call, sharron unavailable, informed pt she will give her a call back. Pt states she will not be available between 2-4pm.

## 2024-06-28 NOTE — TELEPHONE ENCOUNTER
Procedure:  SVT Ablation  Doctor:  Dr. Alexander  Date:  8/15/24  Time:  8am  Arrival:  6:30am  Reps:  Ensite X  Anesthesia:  Yes      Spoke with patient. Please have patient arrive to the main entrance of [facility] ([address] [place] [id number]) and check in with the registration desk.  They will be directed to the Cath Lab.  Remind patient to be NPO after midnight (8 hours prior). Do not apply lotions/creams on skin the day of procedure.     [facility]

## 2024-07-01 ENCOUNTER — OFFICE VISIT (OUTPATIENT)
Dept: INTERNAL MEDICINE CLINIC | Age: 64
End: 2024-07-01
Payer: COMMERCIAL

## 2024-07-01 VITALS
DIASTOLIC BLOOD PRESSURE: 68 MMHG | OXYGEN SATURATION: 98 % | HEART RATE: 78 BPM | BODY MASS INDEX: 33.12 KG/M2 | HEIGHT: 64 IN | SYSTOLIC BLOOD PRESSURE: 116 MMHG | WEIGHT: 194 LBS

## 2024-07-01 DIAGNOSIS — R79.89 ELEVATED LFTS: ICD-10-CM

## 2024-07-01 DIAGNOSIS — Z71.89 ACP (ADVANCE CARE PLANNING): ICD-10-CM

## 2024-07-01 DIAGNOSIS — R00.2 PALPITATIONS: ICD-10-CM

## 2024-07-01 DIAGNOSIS — Z00.00 ENCOUNTER FOR WELL ADULT EXAM WITHOUT ABNORMAL FINDINGS: Primary | ICD-10-CM

## 2024-07-01 DIAGNOSIS — E78.00 HYPERCHOLESTEREMIA: ICD-10-CM

## 2024-07-01 DIAGNOSIS — F41.9 ANXIETY: ICD-10-CM

## 2024-07-01 PROCEDURE — 99213 OFFICE O/P EST LOW 20 MIN: CPT | Performed by: INTERNAL MEDICINE

## 2024-07-01 PROCEDURE — G8427 DOCREV CUR MEDS BY ELIG CLIN: HCPCS | Performed by: INTERNAL MEDICINE

## 2024-07-01 PROCEDURE — G8417 CALC BMI ABV UP PARAM F/U: HCPCS | Performed by: INTERNAL MEDICINE

## 2024-07-01 PROCEDURE — 1036F TOBACCO NON-USER: CPT | Performed by: INTERNAL MEDICINE

## 2024-07-01 PROCEDURE — 3017F COLORECTAL CA SCREEN DOC REV: CPT | Performed by: INTERNAL MEDICINE

## 2024-07-01 PROCEDURE — 99396 PREV VISIT EST AGE 40-64: CPT | Performed by: INTERNAL MEDICINE

## 2024-07-01 PROCEDURE — 36415 COLL VENOUS BLD VENIPUNCTURE: CPT | Performed by: INTERNAL MEDICINE

## 2024-07-01 RX ORDER — SERTRALINE HYDROCHLORIDE 25 MG/1
25 TABLET, FILM COATED ORAL DAILY
Qty: 30 TABLET | Refills: 0 | Status: SHIPPED | OUTPATIENT
Start: 2024-07-01

## 2024-07-01 SDOH — ECONOMIC STABILITY: INCOME INSECURITY: HOW HARD IS IT FOR YOU TO PAY FOR THE VERY BASICS LIKE FOOD, HOUSING, MEDICAL CARE, AND HEATING?: NOT HARD AT ALL

## 2024-07-01 SDOH — ECONOMIC STABILITY: FOOD INSECURITY: WITHIN THE PAST 12 MONTHS, YOU WORRIED THAT YOUR FOOD WOULD RUN OUT BEFORE YOU GOT MONEY TO BUY MORE.: NEVER TRUE

## 2024-07-01 SDOH — ECONOMIC STABILITY: FOOD INSECURITY: WITHIN THE PAST 12 MONTHS, THE FOOD YOU BOUGHT JUST DIDN'T LAST AND YOU DIDN'T HAVE MONEY TO GET MORE.: NEVER TRUE

## 2024-07-01 NOTE — PATIENT INSTRUCTIONS
[facility]:  [place] [phone]  [facility] (8 locations):  [phone]  [place] [facility] [phone]   [facility] [phone]    Advance Care Planning     Advance Care Planning opens a door to talk about and write down your wishes before a sudden accident or illness.  Make your goals, values, and preferences known.     This puts you in the ’s seat and helps others know what matters most to you so they won’t have to guess.      Where can you learn more?    Go to https://www.[employer]/patient-resources/advance-care-planning   to learn how to:    Name someone you trust to make healthcare decisions for you, only if you can’t. (Healthcare Power of )    Document your wishes for care if you were seriously ill and not expected to recover or are approaching end of life. (Advance Directive or Living Will)    The same page can be found using the QR code below.                Starting a Weight Loss Plan: Care Instructions  Overview     It can be a challenge to lose weight. But your doctor can help you make a weight-loss plan that meets your needs.  You don't have to make a lot of big changes at once. A better idea might be to focus on small changes and stick with them. When those changes become habit, you can add a few more changes.  Some people find it helpful to take an exercise or nutrition class. If you have questions, ask your doctor about seeing a registered dietitian or an exercise specialist. You might also think about joining a weight-loss support group.  If you're not ready to make changes right now, try to pick a date in the future. Then make an appointment with your doctor to talk about when and how you'll get started with a plan.  Follow-up care is a key part of your treatment and safety. Be sure to make and go to all appointments, and call your doctor if you are having problems. It's also a good idea to know your test results and keep a list of

## 2024-07-01 NOTE — PROGRESS NOTES
Food Insecurity: No Food Insecurity (7/1/2024)    Hunger Vital Sign     Worried About Running Out of Food in the Last Year: Never true     Ran Out of Food in the Last Year: Never true   Transportation Needs: Unknown (7/1/2024)    PRAPARE - Transportation     Lack of Transportation (Medical): Not on file     Lack of Transportation (Non-Medical): No   Physical Activity: Not on file   Stress: Not on file   Social Connections: Not on file   Intimate Partner Violence: Not on file   Housing Stability: Unknown (7/1/2024)    Housing Stability Vital Sign     Unable to Pay for Housing in the Last Year: Not on file     Number of Places Lived in the Last Year: Not on file     Unstable Housing in the Last Year: No       Review of Systems:  A comprehensive review of systems was negative except for what was noted in the HPI.     Physical Exam:   Vitals:    07/01/24 0730   BP: 116/68   Pulse: 78   SpO2: 98%   Weight: 88 kg (194 lb)   Height: 1.626 m (5' 4\")     Body mass index is 33.3 kg/m².   Constitutional: She is oriented to person, place, and time. She appears well-developed and well-nourished. No distress.   HEENT:   Head: Normocephalic and atraumatic.   Right Ear: Tympanic membrane, external ear and ear canal normal.   Left Ear: Tympanic membrane, external ear and ear canal normal.   Mouth/Throat: Oropharynx is clear and moist, and mucous membranes are normal.  There is no cervical adenopathy.  Eyes: Conjunctivae and extraocular motions are normal. Pupils are equal, round, and reactive to light.   Neck: Supple. No JVD present. Carotid bruit is not present. No mass and no thyromegaly present.   Cardiovascular: Normal rate, regular rhythm, normal heart sounds and intact distal pulses.    Pulmonary/Chest: Effort normal and breath sounds normal. No respiratory distress. She has no wheezes, rhonchi or rales.   Abdominal: Soft, non-tender. Bowel sounds and aorta are normal. She exhibits no organomegaly, mass or bruit.

## 2024-07-02 ENCOUNTER — HOSPITAL ENCOUNTER (OUTPATIENT)
Dept: WOMENS IMAGING | Age: 64
Discharge: HOME | End: 2024-07-02
Payer: COMMERCIAL

## 2024-07-02 ENCOUNTER — TELEPHONE (OUTPATIENT)
Dept: CARDIOLOGY CLINIC | Age: 64
End: 2024-07-02

## 2024-07-02 VITALS — HEIGHT: 64 IN | BODY MASS INDEX: 33.12 KG/M2 | WEIGHT: 194 LBS

## 2024-07-02 DIAGNOSIS — I25.10 CORONARY ARTERY DISEASE INVOLVING NATIVE CORONARY ARTERY OF NATIVE HEART WITHOUT ANGINA PECTORIS: Primary | ICD-10-CM

## 2024-07-02 DIAGNOSIS — Z12.31 SCREENING MAMMOGRAM FOR BREAST CANCER: ICD-10-CM

## 2024-07-02 LAB
ALBUMIN SERPL-MCNC: 4.3 G/DL (ref 3.4–5)
ALP SERPL-CCNC: 129 U/L (ref 40–129)
ALT SERPL-CCNC: 25 U/L (ref 10–40)
AST SERPL-CCNC: 23 U/L (ref 15–37)
BASOPHILS # BLD: 0 K/UL (ref 0–0.2)
BASOPHILS NFR BLD: 0.7 %
BILIRUB DIRECT SERPL-MCNC: <0.2 MG/DL (ref 0–0.3)
BILIRUB INDIRECT SERPL-MCNC: NORMAL MG/DL (ref 0–1)
BILIRUB SERPL-MCNC: 0.3 MG/DL (ref 0–1)
CHOLEST SERPL-MCNC: 227 MG/DL (ref 0–199)
DEPRECATED RDW RBC AUTO: 13.6 % (ref 12.4–15.4)
EOSINOPHIL # BLD: 0.2 K/UL (ref 0–0.6)
EOSINOPHIL NFR BLD: 2.8 %
HCT VFR BLD AUTO: 40.5 % (ref 36–48)
HDLC SERPL-MCNC: 57 MG/DL (ref 40–60)
HGB BLD-MCNC: 13.6 G/DL (ref 12–16)
LDLC SERPL CALC-MCNC: 143 MG/DL
LYMPHOCYTES # BLD: 3 K/UL (ref 1–5.1)
LYMPHOCYTES NFR BLD: 51.7 %
MAGNESIUM SERPL-MCNC: 2.4 MG/DL (ref 1.8–2.4)
MCH RBC QN AUTO: 32.1 PG (ref 26–34)
MCHC RBC AUTO-ENTMCNC: 33.5 G/DL (ref 31–36)
MCV RBC AUTO: 95.9 FL (ref 80–100)
MONOCYTES # BLD: 0.6 K/UL (ref 0–1.3)
MONOCYTES NFR BLD: 10.4 %
NEUTROPHILS # BLD: 2 K/UL (ref 1.7–7.7)
NEUTROPHILS NFR BLD: 34.4 %
PLATELET # BLD AUTO: 230 K/UL (ref 135–450)
PMV BLD AUTO: 9.1 FL (ref 5–10.5)
PROT SERPL-MCNC: 7.3 G/DL (ref 6.4–8.2)
RBC # BLD AUTO: 4.22 M/UL (ref 4–5.2)
TRIGL SERPL-MCNC: 136 MG/DL (ref 0–150)
TSH SERPL DL<=0.005 MIU/L-ACNC: 3.17 UIU/ML (ref 0.27–4.2)
VLDLC SERPL CALC-MCNC: 27 MG/DL
WBC # BLD AUTO: 5.8 K/UL (ref 4–11)

## 2024-07-02 PROCEDURE — 77063 BREAST TOMOSYNTHESIS BI: CPT

## 2024-07-02 NOTE — TELEPHONE ENCOUNTER
----- Message from John Garrison MD sent at 7/2/2024 10:53 AM EDT -----  Please notify patient that their lab results again show elevated Chol  Mg and TSH are normal   Per prior notes, was supposed to have PA and start Repatha due to CAD on Ca score  Repatha not on med list  Again discuss our recs and start Repatha if agreeable

## 2024-07-02 NOTE — TELEPHONE ENCOUNTER
Spoke with patient. She would like to hold off on Repatha and work on diet and exercise. She wants to recheck in 6 months and see how her labs look.     She was seen by EP for SVT on 6/24/2024. She would like to hold off on an ablation. She is asking how ling she will need to be on Metoprolol.

## 2024-07-09 ENCOUNTER — PATIENT MESSAGE (OUTPATIENT)
Dept: CARDIOLOGY CLINIC | Age: 64
End: 2024-07-09

## 2024-07-10 NOTE — TELEPHONE ENCOUNTER
\"Hi Dr Garrison,   [facility] pharmacy is trying to reach you regarding the cholesterol medicine you prescribed for me.   I know you want me to hold on taking it until I get retested for the ALT/AST liver tests.    The pharmacist said that a letter may be necessary to justify me not taking a regular statin.   LMK if there is something I need to do.   I picked up the beta blocker & baby aspirin.         Flaquita\"    MG please advise

## 2024-07-11 NOTE — TELEPHONE ENCOUNTER
I called patient and left VM to call back. I  called gto clarify which medication she is talking about. She was approved for Repatha.

## 2024-07-15 NOTE — TELEPHONE ENCOUNTER
Called and spoke with RASHAWN at [facility], she is approved, needs to go to another pharmacy to be filled, they are not contracted with her insurance.       No rx was sent in last ov  MG please advise on dose for pt and we can send rx

## 2024-07-17 RX ORDER — EVOLOCUMAB 140 MG/ML
140 INJECTION, SOLUTION SUBCUTANEOUS
Qty: 2.1 ML | Refills: 5 | Status: SHIPPED | OUTPATIENT
Start: 2024-07-17

## 2024-07-19 NOTE — TELEPHONE ENCOUNTER
Procedure canceled per Omar sampsong 7/3/24 -     Anatoliy Joe - I’ve discussed with my family and will delay having this procedure. Also the date had been changed to July 30. Whichever date you have, please cancel for now.

## 2024-07-25 DIAGNOSIS — F41.9 ANXIETY: ICD-10-CM

## 2024-07-25 RX ORDER — SERTRALINE HYDROCHLORIDE 25 MG/1
25 TABLET, FILM COATED ORAL DAILY
Qty: 90 TABLET | Refills: 1 | OUTPATIENT
Start: 2024-07-25

## 2024-08-20 NOTE — PROGRESS NOTES
[facility]   Cardiac Follow up     Referring Provider:  Stephani Gaffney MD     Chief Complaint   Patient presents with    Follow-up     Feeling much better since last ov     Coronary Artery Disease    Palpitations      Flaquita Carr   1960    History of Present Illness:    Flaquita Carr is a 63 y.o. female who is here today for follow up for a past medical history of coronary artery disease- noted on CT calcium score of 54 in 2021, DVT, palpitations- SVT, TAA- 4.2 cm on CT chest 5/2024, scending aorta aneurysmal 3.5 in 2009, 3.9 10/2021.   Cardiac event monitor worn from 1/31-2/14/2023, average heart rate 84 (), PAC- 0.2%, PVC's 1.6%- Multifocal with symptoms. She developed an acute left leg DVT after a long care ride to [place] in 11/2023. Venous duplex was performed showing both superficial and deep venous thrombosis. PCP started eliquis and she was referred to Dr. Barahona. Recommendation was anticoagulation for 6 months    Cardiac event monitor worn from 4/22-5/19/2024 showed an average heart rate of 84 (), SR- rapid SVT episodes- >200 BPM, longest 10+ minutes, NSVT 9 beats. Patient was seen by the EP team to discuss an SVT ablation. Patient spoke with her family and preferred to hold off.    Today she states she has been feeling well since her last visit. She states she overall feels better since starting Metoprolol. She states she wanted to give Metoprolol more time to work prior to scheduling an ablation. She did not feel all of her 211 episodes of SVT while wearing her monitor. Patient currently denies any weight gain, edema, chest pain, shortness of breath, dizziness, and syncope.      Past Medical History:   has a past medical history of Atrophic vaginitis, Coronary artery disease involving native coronary artery of native heart without angina pectoris, Eczema, Gastroesophageal reflux disease without esophagitis, Hypercholesteremia, Hypercholesteremia, Overactive

## 2024-08-26 ENCOUNTER — PATIENT MESSAGE (OUTPATIENT)
Dept: INTERNAL MEDICINE CLINIC | Age: 64
End: 2024-08-26

## 2024-08-26 ENCOUNTER — OFFICE VISIT (OUTPATIENT)
Dept: CARDIOLOGY CLINIC | Age: 64
End: 2024-08-26
Payer: COMMERCIAL

## 2024-08-26 VITALS
HEIGHT: 64 IN | DIASTOLIC BLOOD PRESSURE: 74 MMHG | OXYGEN SATURATION: 93 % | SYSTOLIC BLOOD PRESSURE: 128 MMHG | BODY MASS INDEX: 33.29 KG/M2 | HEART RATE: 80 BPM | WEIGHT: 195 LBS

## 2024-08-26 DIAGNOSIS — I71.21 ANEURYSM OF ASCENDING AORTA WITHOUT RUPTURE (HCC): ICD-10-CM

## 2024-08-26 DIAGNOSIS — E78.00 HYPERCHOLESTEREMIA: ICD-10-CM

## 2024-08-26 DIAGNOSIS — I47.10 SVT (SUPRAVENTRICULAR TACHYCARDIA) (HCC): ICD-10-CM

## 2024-08-26 DIAGNOSIS — I25.10 CORONARY ARTERY DISEASE INVOLVING NATIVE CORONARY ARTERY OF NATIVE HEART WITHOUT ANGINA PECTORIS: Primary | ICD-10-CM

## 2024-08-26 PROBLEM — I71.20 THORACIC AORTIC ANEURYSM (TAA) (HCC): Status: ACTIVE | Noted: 2024-08-26

## 2024-08-26 PROCEDURE — G8427 DOCREV CUR MEDS BY ELIG CLIN: HCPCS | Performed by: INTERNAL MEDICINE

## 2024-08-26 PROCEDURE — G8417 CALC BMI ABV UP PARAM F/U: HCPCS | Performed by: INTERNAL MEDICINE

## 2024-08-26 PROCEDURE — 3017F COLORECTAL CA SCREEN DOC REV: CPT | Performed by: INTERNAL MEDICINE

## 2024-08-26 PROCEDURE — 1036F TOBACCO NON-USER: CPT | Performed by: INTERNAL MEDICINE

## 2024-08-26 PROCEDURE — 99214 OFFICE O/P EST MOD 30 MIN: CPT | Performed by: INTERNAL MEDICINE

## 2024-08-26 RX ORDER — EVOLOCUMAB 140 MG/ML
140 INJECTION, SOLUTION SUBCUTANEOUS
Qty: 2.1 ML | Refills: 11 | Status: SHIPPED | OUTPATIENT
Start: 2024-08-26

## 2024-08-26 RX ORDER — ASPIRIN 81 MG/1
81 TABLET ORAL DAILY
COMMUNITY

## 2024-08-26 NOTE — PATIENT INSTRUCTIONS
Plan:  ~Repeat CT chest in 1 year to evaluate ascending aorta- non contrast    ~Call [facility] scheduling at [phone] to schedule testing    ~We discussed an ablation for SVT- she would like to continue Metoprolol and hold off on ablation   ~We sent through script for Reptha to the pharmacy. This is approved through May of 2025. Go to the Repatha website for coupons   ~We also discussed that you have coronary artery disease based on your calcium score.    ~Repeat fasting lipids and CMP in 3 months after starting Repatha    ~We discussed that your bad cholesterol- LDL is less than 70  ~Continue Aspirin 81 mg daily   Cardiac medications reviewed including indications and pertinent side effects. Medication list updated at this visit.   Patient verbalizes understanding of the need for treatment and education has been provided at today's visit. Additional education material will be provided in after visit summary.     ~Recommend more dedicated exercise- 30-45 minutes of sustained cardio exercise 4-5 days per week   ~Low fat, low cholesterol diet   Check blood pressure and heart rate at home a few times per week- keep a log with dates and times and bring to office visit   Regular exercise and following a healthy diet encouraged   Follow up with me in 3 months

## 2024-08-28 ENCOUNTER — TELEPHONE (OUTPATIENT)
Dept: ADMINISTRATIVE | Age: 64
End: 2024-08-28

## 2024-08-28 RX ORDER — METOPROLOL SUCCINATE 25 MG/1
25 TABLET, EXTENDED RELEASE ORAL DAILY
Qty: 90 TABLET | Refills: 0 | Status: SHIPPED | OUTPATIENT
Start: 2024-08-28

## 2024-08-28 NOTE — TELEPHONE ENCOUNTER
Submitted PA for REPATHA  [facility]ia [facility]: [id number]  STATUS: Your PA has been resolved, no additional PA is required.     Follow up done daily; if no decision with in three days we will refax.  If another three days goes by with no decision will call the insurance for status.

## 2024-08-29 ENCOUNTER — TELEMEDICINE (OUTPATIENT)
Dept: INTERNAL MEDICINE CLINIC | Age: 64
End: 2024-08-29
Payer: COMMERCIAL

## 2024-08-29 DIAGNOSIS — F41.9 ANXIETY: Primary | ICD-10-CM

## 2024-08-29 DIAGNOSIS — E66.01 CLASS 3 SEVERE OBESITY DUE TO EXCESS CALORIES WITHOUT SERIOUS COMORBIDITY IN ADULT, UNSPECIFIED BMI (HCC): ICD-10-CM

## 2024-08-29 PROCEDURE — 3017F COLORECTAL CA SCREEN DOC REV: CPT | Performed by: INTERNAL MEDICINE

## 2024-08-29 PROCEDURE — 99213 OFFICE O/P EST LOW 20 MIN: CPT | Performed by: INTERNAL MEDICINE

## 2024-08-29 PROCEDURE — G8427 DOCREV CUR MEDS BY ELIG CLIN: HCPCS | Performed by: INTERNAL MEDICINE

## 2024-08-29 RX ORDER — TIRZEPATIDE 2.5 MG/.5ML
2.5 INJECTION, SOLUTION SUBCUTANEOUS WEEKLY
Qty: 2 ML | Refills: 0 | Status: SHIPPED | OUTPATIENT
Start: 2024-08-29

## 2024-08-29 RX ORDER — SERTRALINE HYDROCHLORIDE 25 MG/1
25 TABLET, FILM COATED ORAL DAILY
Qty: 30 TABLET | Refills: 2 | Status: SHIPPED | OUTPATIENT
Start: 2024-08-29

## 2024-08-29 NOTE — PROGRESS NOTES
Patient was evaluated through a synchronous (real-time) video encounter. Patient identification was verified at the start of the visit. She (or guardian if applicable) is aware that this is a billable service, which includes applicable co-pays. This visit was conducted with the patient's (and/or legal guardian's) verbal consent. She has not had a related appointment within my department in the past 7 days or scheduled within the next 24 hours.   The patient was located at Home: [address] [place] [address].  The provider was located at Home (Appt Dept State): [place].    Date of Service:  8/29/2024    Chief Complaint:      Chief Complaint   Patient presents with    Anxiety    Weight Loss       Assessment/Plan:    Diagnoses and all orders for this visit:    Anxiety  -     sertraline (ZOLOFT) 25 MG tablet; Take 1 tablet by mouth daily    Class 3 severe obesity due to excess calories without serious comorbidity in adult, unspecified BMI (HCC)  Start Tirzepatide (MOUNJARO) 2.5 MG/0.5ML SOPN SC injection; Inject 0.5 mLs into the skin once a week    Goals:   -Eat small amounts of food 4-5 times daily  -Avoid high fat and high sugar foods  -Include protein with all meals and snacks  -Avoid carbonation and caffeine  -Avoid calorie containing beverages  -Increase physical activity as tolerated      Return VV Weight management 10/3.      HPI:  Flaquita Carr is a 63 y.o.    She also have been having trouble losing weight and would like to start medication to jump start her weight loss.    Anxiety:  improved and feel more relax on zoloft 25 mg evening.  She doesn't feel overwhelmed now.     Palpitations:  stable on Toprol XL 25 mg evening     H/O DVT left LE and popliteal vein thrombosis: stable off Eliquis 5 mg bid and no more pain. No chest pain or shortness of breath.    No results found for: \"LABA1C\"  Lab Results   Component Value Date     04/26/2024    K 4.3 04/26/2024     04/26/2024    CO2 28 04/26/2024

## 2024-10-02 RX ORDER — METOPROLOL SUCCINATE 25 MG/1
25 TABLET, EXTENDED RELEASE ORAL DAILY
Qty: 90 TABLET | Refills: 2 | Status: SHIPPED | OUTPATIENT
Start: 2024-10-02

## 2024-10-03 ENCOUNTER — TELEMEDICINE (OUTPATIENT)
Dept: INTERNAL MEDICINE CLINIC | Age: 64
End: 2024-10-03
Payer: COMMERCIAL

## 2024-10-03 VITALS — HEIGHT: 64 IN | BODY MASS INDEX: 31.58 KG/M2 | WEIGHT: 185 LBS

## 2024-10-03 DIAGNOSIS — E66.813 CLASS 3 SEVERE OBESITY DUE TO EXCESS CALORIES WITHOUT SERIOUS COMORBIDITY IN ADULT, UNSPECIFIED BMI: ICD-10-CM

## 2024-10-03 DIAGNOSIS — E66.01 CLASS 3 SEVERE OBESITY DUE TO EXCESS CALORIES WITHOUT SERIOUS COMORBIDITY IN ADULT, UNSPECIFIED BMI: ICD-10-CM

## 2024-10-03 DIAGNOSIS — F41.9 ANXIETY: ICD-10-CM

## 2024-10-03 PROCEDURE — 99213 OFFICE O/P EST LOW 20 MIN: CPT | Performed by: INTERNAL MEDICINE

## 2024-10-03 PROCEDURE — G8427 DOCREV CUR MEDS BY ELIG CLIN: HCPCS | Performed by: INTERNAL MEDICINE

## 2024-10-03 PROCEDURE — 3017F COLORECTAL CA SCREEN DOC REV: CPT | Performed by: INTERNAL MEDICINE

## 2024-10-03 RX ORDER — TIRZEPATIDE 2.5 MG/.5ML
2.5 INJECTION, SOLUTION SUBCUTANEOUS WEEKLY
Qty: 2 ML | Refills: 1 | Status: SHIPPED | OUTPATIENT
Start: 2024-10-03 | End: 2024-10-03 | Stop reason: ALTCHOICE

## 2024-10-03 RX ORDER — SERTRALINE HYDROCHLORIDE 25 MG/1
25 TABLET, FILM COATED ORAL DAILY
Qty: 30 TABLET | Refills: 6 | Status: SHIPPED | OUTPATIENT
Start: 2024-10-03

## 2024-10-03 NOTE — PROGRESS NOTES
04/26/2024     Lab Results   Component Value Date/Time    CHOL 227 07/01/2024 07:53 AM    TRIG 136 07/01/2024 07:53 AM    HDL 57 07/01/2024 07:53 AM    HDL 65 10/13/2011 11:37 AM     Lab Results   Component Value Date    ALT 25 07/01/2024    AST 23 07/01/2024     Lab Results   Component Value Date    TSH 3.84 06/02/2020    TSH 2.33 01/12/2016     Lab Results   Component Value Date    WBC 5.8 07/01/2024    HGB 13.6 07/01/2024    HCT 40.5 07/01/2024    MCV 95.9 07/01/2024     07/01/2024     No results found for: \"INR\"   No results found for: \"PSA\"   No results found for: \"LABURIC\"     Patient Active Problem List   Diagnosis    Ganglion cyst of left foot    Bunion, left    Dermatitis    Gastroesophageal reflux disease without esophagitis    RAD (reactive airway disease), mild intermittent, uncomplicated    Asthma due to environmental allergies    Precordial pain    Hypercholesteremia    Varicose veins of both lower extremities    Coronary artery disease involving native coronary artery of native heart without angina pectoris    Palpitations    SVT (supraventricular tachycardia) (HCC)    Thoracic aortic aneurysm (TAA) (HCC)       Allergies   Allergen Reactions    Crestor [Rosuvastatin]     Seasonal     Statins      Outpatient Medications Marked as Taking for the 10/3/24 encounter (Telemedicine) with Stephani Gaffney MD   Medication Sig Dispense Refill    metoprolol succinate (TOPROL XL) 25 MG extended release tablet TAKE 1 TABLET BY MOUTH EVERY DAY 90 tablet 2    sertraline (ZOLOFT) 25 MG tablet Take 1 tablet by mouth daily 30 tablet 2    Tirzepatide (MOUNJARO) 2.5 MG/0.5ML SOPN SC injection Inject 0.5 mLs into the skin once a week 2 mL 0    aspirin 81 MG EC tablet Take 1 tablet by mouth daily      Evolocumab (REPATHA) SOSY syringe Inject 1 mL into the skin every 14 days 2.1 mL 11    Probiotic Product (PRO-BIOTIC BLEND PO) Take by mouth      calcium carbonate 600 MG TABS tablet Take 1 tablet by mouth daily

## 2024-12-27 DIAGNOSIS — E66.813 CLASS 3 SEVERE OBESITY DUE TO EXCESS CALORIES WITHOUT SERIOUS COMORBIDITY IN ADULT, UNSPECIFIED BMI: ICD-10-CM

## 2024-12-27 DIAGNOSIS — E66.01 CLASS 3 SEVERE OBESITY DUE TO EXCESS CALORIES WITHOUT SERIOUS COMORBIDITY IN ADULT, UNSPECIFIED BMI: ICD-10-CM

## 2025-01-02 ENCOUNTER — TELEMEDICINE (OUTPATIENT)
Dept: INTERNAL MEDICINE CLINIC | Age: 65
End: 2025-01-02
Payer: COMMERCIAL

## 2025-01-02 VITALS — HEIGHT: 64 IN | WEIGHT: 175 LBS | BODY MASS INDEX: 29.88 KG/M2

## 2025-01-02 DIAGNOSIS — Z87.898 HISTORY OF MOTION SICKNESS: ICD-10-CM

## 2025-01-02 DIAGNOSIS — E66.01 CLASS 3 SEVERE OBESITY DUE TO EXCESS CALORIES WITHOUT SERIOUS COMORBIDITY IN ADULT, UNSPECIFIED BMI: Primary | ICD-10-CM

## 2025-01-02 DIAGNOSIS — L65.9 HAIR LOSS: ICD-10-CM

## 2025-01-02 DIAGNOSIS — R00.2 PALPITATIONS: ICD-10-CM

## 2025-01-02 DIAGNOSIS — E66.813 CLASS 3 SEVERE OBESITY DUE TO EXCESS CALORIES WITHOUT SERIOUS COMORBIDITY IN ADULT, UNSPECIFIED BMI: Primary | ICD-10-CM

## 2025-01-02 PROCEDURE — 99214 OFFICE O/P EST MOD 30 MIN: CPT | Performed by: INTERNAL MEDICINE

## 2025-01-02 PROCEDURE — G8427 DOCREV CUR MEDS BY ELIG CLIN: HCPCS | Performed by: INTERNAL MEDICINE

## 2025-01-02 PROCEDURE — 3017F COLORECTAL CA SCREEN DOC REV: CPT | Performed by: INTERNAL MEDICINE

## 2025-01-02 RX ORDER — SCOLOPAMINE TRANSDERMAL SYSTEM 1 MG/1
1 PATCH, EXTENDED RELEASE TRANSDERMAL
Qty: 5 PATCH | Refills: 0 | Status: SHIPPED | OUTPATIENT
Start: 2025-01-02

## 2025-01-02 ASSESSMENT — PATIENT HEALTH QUESTIONNAIRE - PHQ9
SUM OF ALL RESPONSES TO PHQ QUESTIONS 1-9: 0
SUM OF ALL RESPONSES TO PHQ QUESTIONS 1-9: 0
2. FEELING DOWN, DEPRESSED OR HOPELESS: NOT AT ALL
SUM OF ALL RESPONSES TO PHQ9 QUESTIONS 1 & 2: 0
SUM OF ALL RESPONSES TO PHQ QUESTIONS 1-9: 0
SUM OF ALL RESPONSES TO PHQ QUESTIONS 1-9: 0
1. LITTLE INTEREST OR PLEASURE IN DOING THINGS: NOT AT ALL

## 2025-01-02 NOTE — PROGRESS NOTES
Patient was evaluated through a synchronous (real-time) video encounter. Patient identification was verified at the start of the visit. She (or guardian if applicable) is aware that this is a billable service, which includes applicable co-pays. This visit was conducted with the patient's (and/or legal guardian's) verbal consent. She has not had a related appointment within my department in the past 7 days or scheduled within the next 24 hours.   The patient was located at Home: [address] [place] [address].  The provider was located at Home (Appt Dept State): [place].    Date of Service:  1/2/2025    Chief Complaint:      Chief Complaint   Patient presents with    Weight Management    Travel Consult    Alopecia       Assessment/Plan:    Flaquita was seen today for weight management, travel consult and alopecia.    Diagnoses and all orders for this visit:    Class 3 severe obesity due to excess calories without serious comorbidity in adult, unspecified BMI  -     Semaglutide,0.25 or 0.5MG/DOS, 2 MG/3ML SOPN; Inject 0.5 mg qweek    History of motion sickness  -     scopolamine (TRANSDERM-SCOP) transdermal patch; Place 1 patch onto the skin every 72 hours    Palpitations    Hair loss    Will monitor since would not want to start thyroid medication due to history of palpitations.      Return [facility] Weight Management 3/20.      HPI:  Flaquita Carr is a 64 y.o.    She will be on a 14 days cruise and she has history of motion sickness.    She also complains of noticing hair loss for the past 9 months.  Last TSH was 3 July 2024.  No other symptoms.    Obesity:  she lost about 20#, weighing 175 # (baseline was 195# in August 2024) on Ozempic 0.5 mg without constipation.     Anxiety:  improved and feel more relax on zoloft 25 mg evening.  She doesn't feel overwhelmed now.     Palpitations:  stable on Toprol XL 25 mg evening     H/O DVT left LE and popliteal vein thrombosis: stable off Eliquis 5 mg bid and no more pain. No

## 2025-01-03 DIAGNOSIS — I25.10 CORONARY ARTERY DISEASE INVOLVING NATIVE CORONARY ARTERY OF NATIVE HEART WITHOUT ANGINA PECTORIS: ICD-10-CM

## 2025-01-03 LAB
ALBUMIN SERPL-MCNC: 4.3 G/DL (ref 3.4–5)
ALBUMIN/GLOB SERPL: 1.6 {RATIO} (ref 1.1–2.2)
ALP SERPL-CCNC: 101 U/L (ref 40–129)
ALT SERPL-CCNC: 24 U/L (ref 10–40)
ANION GAP SERPL CALCULATED.3IONS-SCNC: 11 MMOL/L (ref 3–16)
AST SERPL-CCNC: 21 U/L (ref 15–37)
BILIRUB SERPL-MCNC: 0.3 MG/DL (ref 0–1)
BUN SERPL-MCNC: 9 MG/DL (ref 7–20)
CALCIUM SERPL-MCNC: 9.1 MG/DL (ref 8.3–10.6)
CHLORIDE SERPL-SCNC: 101 MMOL/L (ref 99–110)
CHOLEST SERPL-MCNC: 229 MG/DL (ref 0–199)
CO2 SERPL-SCNC: 28 MMOL/L (ref 21–32)
CREAT SERPL-MCNC: 0.7 MG/DL (ref 0.6–1.2)
GFR SERPLBLD CREATININE-BSD FMLA CKD-EPI: >90 ML/MIN/{1.73_M2}
GLUCOSE SERPL-MCNC: 79 MG/DL (ref 70–99)
HDLC SERPL-MCNC: 51 MG/DL (ref 40–60)
LDLC SERPL CALC-MCNC: 148 MG/DL
POTASSIUM SERPL-SCNC: 4.5 MMOL/L (ref 3.5–5.1)
PROT SERPL-MCNC: 7 G/DL (ref 6.4–8.2)
SODIUM SERPL-SCNC: 140 MMOL/L (ref 136–145)
TRIGL SERPL-MCNC: 149 MG/DL (ref 0–150)
VLDLC SERPL CALC-MCNC: 30 MG/DL

## 2025-01-06 NOTE — RESULT ENCOUNTER NOTE
Called patient and left VM to call back. When she calls back let her know-     Please notify patient that their lab results show chol remains  High  Recommended Repatha at last OV and PA done  Has she not started?  Repatha will be best option for her chol  Has OV next week and can further discuss then

## 2025-01-08 NOTE — RESULT ENCOUNTER NOTE
Pt returned call. Relayed [facility] message. Pt agreeable to starting repatha. Will sent to [facility] to start PA

## 2025-01-10 NOTE — PROGRESS NOTES
______________________________________________________________  PHYSICAL THERAPY DISCHARGE VISIT SUMMARY    SUBJECTIVE:  Pt states she's doing well.  She saw Dr. Maldonado & was told she has no hip precautions.  She's allowed to drive.    MEDICATION CHANGES:  81 mg aspirin daily     FALLS SINCE LAST VISIT:  None    MENTAL STATUS:  Alert & oriented x 4    PAIN RATING:     LOCATION: (R) posterior hip     AT BEST: 0/10     AT WORST: 3/10     AT PRESENT: 0/10     INCREASES WITH: At end of the day.    CONTROLLED WITH:Tylenol at times, ice    EDEMA:  Very mild in (R) post hip    WOUND / SKIN CONDITION:  (R) posterior lateral hip incision healing well with steri strips in place.    DISCHARGE STATUS:  Home to self care & outpatient therapy at [facility]    DISCHARGE CONDITION:  Good    INSTRUCTIONS HOME PROGRAM PROVIDED:  Yes          CONTENT: Written/pictoral Home exercise program          PATIENT/CAREGIVER LEVEL OF COMPLIANCE:  Good    UNMET NEEDS: None    HOME HEALTH SERVICES CONTINUING:  None
your Repatha prior authorization. We discussed we may need to start Zetia if Repatha not approved      Cardiac medications reviewed including indications and pertinent side effects. Medication list updated at this visit.   Patient verbalizes understanding of the need for treatment and education has been provided at today's visit. Additional education material will be provided in after visit summary.    Check blood pressure and heart rate at home a few times per week- keep a log with dates and times and bring to office visit   Regular exercise and following a healthy diet encouraged   Follow up with me in 1 year     Scribe's attestation:  This note was scribed in the presence of Dr. John Garrison M.D. By Melissa Miner RN    The scribe’s documentation has been prepared under my direction and personally reviewed by me in its entirety.  I confirm that the note above accurately reflects all work, treatment, procedures, and medical decision making performed by me.    Dr. John Garrison MD    Thank you for allowing me to participate in the care of this individual.      Del Garrison M.D., [facility], [facility]

## 2025-01-13 ENCOUNTER — OFFICE VISIT (OUTPATIENT)
Dept: CARDIOLOGY CLINIC | Age: 65
End: 2025-01-13
Payer: COMMERCIAL

## 2025-01-13 VITALS
DIASTOLIC BLOOD PRESSURE: 72 MMHG | BODY MASS INDEX: 30.99 KG/M2 | WEIGHT: 181.5 LBS | SYSTOLIC BLOOD PRESSURE: 130 MMHG | OXYGEN SATURATION: 98 % | HEIGHT: 64 IN | HEART RATE: 65 BPM

## 2025-01-13 DIAGNOSIS — I25.10 CORONARY ARTERY DISEASE INVOLVING NATIVE CORONARY ARTERY OF NATIVE HEART WITHOUT ANGINA PECTORIS: Primary | ICD-10-CM

## 2025-01-13 DIAGNOSIS — I47.10 SVT (SUPRAVENTRICULAR TACHYCARDIA) (HCC): ICD-10-CM

## 2025-01-13 DIAGNOSIS — E78.00 HYPERCHOLESTEREMIA: ICD-10-CM

## 2025-01-13 DIAGNOSIS — R00.2 PALPITATIONS: ICD-10-CM

## 2025-01-13 DIAGNOSIS — I71.21 ANEURYSM OF ASCENDING AORTA WITHOUT RUPTURE (HCC): ICD-10-CM

## 2025-01-13 PROCEDURE — G8427 DOCREV CUR MEDS BY ELIG CLIN: HCPCS | Performed by: INTERNAL MEDICINE

## 2025-01-13 PROCEDURE — 1036F TOBACCO NON-USER: CPT | Performed by: INTERNAL MEDICINE

## 2025-01-13 PROCEDURE — 99214 OFFICE O/P EST MOD 30 MIN: CPT | Performed by: INTERNAL MEDICINE

## 2025-01-13 PROCEDURE — G8417 CALC BMI ABV UP PARAM F/U: HCPCS | Performed by: INTERNAL MEDICINE

## 2025-01-13 PROCEDURE — 3017F COLORECTAL CA SCREEN DOC REV: CPT | Performed by: INTERNAL MEDICINE

## 2025-01-13 RX ORDER — EVOLOCUMAB 140 MG/ML
140 INJECTION, SOLUTION SUBCUTANEOUS
Qty: 2.1 ML | Refills: 11 | Status: SHIPPED | OUTPATIENT
Start: 2025-01-13

## 2025-01-17 NOTE — TELEPHONE ENCOUNTER
----- Message from John Garrison MD sent at 6/17/2024  2:58 PM EDT -----  Please notify patient monitor confirms fast heart rhythms  Has OV w/ EP next week - be sure to keep that and they will discuss treatment   
Left  for patient to call back. When she calls back let her know-     Please notify patient monitor confirms fast heart rhythms  Has OV w/ EP next week - be sure to keep that and they will discuss treatment   
Pt returned call. Message given. Pt states her episode where her hr was in the 200s was before beta blockers. Pt wanted [facility] to be aware of his.  
No

## 2025-01-27 ENCOUNTER — TELEPHONE (OUTPATIENT)
Dept: ADMINISTRATIVE | Age: 65
End: 2025-01-27

## 2025-01-27 NOTE — TELEPHONE ENCOUNTER
Received medical records request via MM. This team completes medication prior authorizations only.     If this requires a response please respond to the pool ( P [facility] MEDICATION PRE-AUTH).      Thank you please advise patient.

## 2025-01-30 ENCOUNTER — TELEPHONE (OUTPATIENT)
Dept: INTERNAL MEDICINE CLINIC | Age: 65
End: 2025-01-30

## 2025-01-30 NOTE — TELEPHONE ENCOUNTER
Received call from [employer] regarding a records request we received from them. She is asking turnaround time on release.  Advised I an not sure, we have a MRO office that handles records requests, but usually within 30 days.  Request was scanned. I will double check to make sure it was sent to our MRO office and is currently being worked on.  Ref# [id number]

## 2025-02-05 DIAGNOSIS — J45.20 RAD (REACTIVE AIRWAY DISEASE), MILD INTERMITTENT, UNCOMPLICATED: ICD-10-CM

## 2025-02-05 DIAGNOSIS — J45.909 ASTHMA DUE TO ENVIRONMENTAL ALLERGIES: ICD-10-CM

## 2025-02-05 RX ORDER — MONTELUKAST SODIUM 10 MG/1
TABLET ORAL
Qty: 30 TABLET | Refills: 1 | Status: SHIPPED | OUTPATIENT
Start: 2025-02-05

## 2025-02-28 DIAGNOSIS — J45.909 ASTHMA DUE TO ENVIRONMENTAL ALLERGIES: ICD-10-CM

## 2025-02-28 DIAGNOSIS — J45.20 RAD (REACTIVE AIRWAY DISEASE), MILD INTERMITTENT, UNCOMPLICATED: ICD-10-CM

## 2025-03-03 RX ORDER — MONTELUKAST SODIUM 10 MG/1
TABLET ORAL
Qty: 90 TABLET | Refills: 1 | OUTPATIENT
Start: 2025-03-03

## 2025-03-20 ENCOUNTER — TELEMEDICINE (OUTPATIENT)
Dept: INTERNAL MEDICINE CLINIC | Age: 65
End: 2025-03-20
Payer: COMMERCIAL

## 2025-03-20 ENCOUNTER — PATIENT MESSAGE (OUTPATIENT)
Dept: CARDIOLOGY CLINIC | Age: 65
End: 2025-03-20

## 2025-03-20 VITALS — HEIGHT: 64 IN | WEIGHT: 182 LBS | BODY MASS INDEX: 31.07 KG/M2

## 2025-03-20 DIAGNOSIS — M79.661 BILATERAL CALF PAIN: Primary | ICD-10-CM

## 2025-03-20 DIAGNOSIS — E66.01 CLASS 3 SEVERE OBESITY DUE TO EXCESS CALORIES WITHOUT SERIOUS COMORBIDITY IN ADULT, UNSPECIFIED BMI: ICD-10-CM

## 2025-03-20 DIAGNOSIS — Z86.718 HISTORY OF BLOOD CLOTS: ICD-10-CM

## 2025-03-20 DIAGNOSIS — F41.9 ANXIETY: ICD-10-CM

## 2025-03-20 DIAGNOSIS — E66.813 CLASS 3 SEVERE OBESITY DUE TO EXCESS CALORIES WITHOUT SERIOUS COMORBIDITY IN ADULT, UNSPECIFIED BMI: ICD-10-CM

## 2025-03-20 DIAGNOSIS — M79.662 BILATERAL CALF PAIN: Primary | ICD-10-CM

## 2025-03-20 PROCEDURE — 3017F COLORECTAL CA SCREEN DOC REV: CPT | Performed by: INTERNAL MEDICINE

## 2025-03-20 PROCEDURE — G8427 DOCREV CUR MEDS BY ELIG CLIN: HCPCS | Performed by: INTERNAL MEDICINE

## 2025-03-20 PROCEDURE — 99214 OFFICE O/P EST MOD 30 MIN: CPT | Performed by: INTERNAL MEDICINE

## 2025-03-20 RX ORDER — SERTRALINE HYDROCHLORIDE 25 MG/1
25 TABLET, FILM COATED ORAL DAILY
Qty: 30 TABLET | Refills: 2 | Status: SHIPPED | OUTPATIENT
Start: 2025-03-20

## 2025-03-20 SDOH — ECONOMIC STABILITY: FOOD INSECURITY: WITHIN THE PAST 12 MONTHS, YOU WORRIED THAT YOUR FOOD WOULD RUN OUT BEFORE YOU GOT MONEY TO BUY MORE.: NEVER TRUE

## 2025-03-20 SDOH — ECONOMIC STABILITY: FOOD INSECURITY: WITHIN THE PAST 12 MONTHS, THE FOOD YOU BOUGHT JUST DIDN'T LAST AND YOU DIDN'T HAVE MONEY TO GET MORE.: NEVER TRUE

## 2025-03-20 NOTE — PROGRESS NOTES
mL 2    sertraline (ZOLOFT) 25 MG tablet Take 1 tablet by mouth daily 30 tablet 6    metoprolol succinate (TOPROL XL) 25 MG extended release tablet TAKE 1 TABLET BY MOUTH EVERY DAY 90 tablet 2    aspirin 81 MG EC tablet Take 1 tablet by mouth daily      Probiotic Product (PRO-BIOTIC BLEND PO) Take by mouth      calcium carbonate 600 MG TABS tablet Take 1 tablet by mouth daily      Melatonin 10 MG TABS Take 20 mg by mouth nightly as needed      diphenhydrAMINE HCl, Sleep, 25 MG CAPS Take 50 mg by mouth nightly as needed      Multiple Vitamin (MULTIVITAMIN PO) Take by mouth daily           Review of Systems: 14 systems were negative except of what was stated on HPI    Nursing note and vitals reviewed.  There were no vitals filed for this visit.  Wt Readings from Last 3 Encounters:   01/13/25 82.3 kg (181 lb 8 oz)   01/02/25 79.4 kg (175 lb)   01/02/25 79.4 kg (175 lb)     BP Readings from Last 3 Encounters:   01/13/25 130/72   08/26/24 128/74   07/01/24 116/68     There is no height or weight on file to calculate BMI.  Constitutional: Patient appears well-developed and well-nourished. No distress.   Head: Normocephalic and atraumatic.   Psychiatric: Normal mood and affect. Behavior is normal.

## 2025-03-20 NOTE — TELEPHONE ENCOUNTER
Last Office Visit: 1/13/2025 Provider: [facility]  **Is provider OOT? Yes    Next Office Visit: no Follow up with me in 1 year     LAST LABS:   Lipid:   Lab Results   Component Value Date    CHOL 229 (H) 01/03/2025    TRIG 149 01/03/2025    HDL 51 01/03/2025     (H) 01/03/2025    VLDL 30 01/03/2025

## 2025-04-01 ENCOUNTER — RESULTS FOLLOW-UP (OUTPATIENT)
Dept: INTERNAL MEDICINE CLINIC | Age: 65
End: 2025-04-01

## 2025-04-01 ENCOUNTER — HOSPITAL ENCOUNTER (OUTPATIENT)
Dept: VASCULAR LAB | Age: 65
Discharge: HOME | End: 2025-04-03
Attending: INTERNAL MEDICINE
Payer: COMMERCIAL

## 2025-04-01 DIAGNOSIS — M79.661 BILATERAL CALF PAIN: ICD-10-CM

## 2025-04-01 DIAGNOSIS — Z86.718 HISTORY OF BLOOD CLOTS: ICD-10-CM

## 2025-04-01 DIAGNOSIS — M79.662 BILATERAL CALF PAIN: ICD-10-CM

## 2025-04-01 PROCEDURE — 93970 EXTREMITY STUDY: CPT

## 2025-04-05 DIAGNOSIS — E66.813 CLASS 3 SEVERE OBESITY DUE TO EXCESS CALORIES WITHOUT SERIOUS COMORBIDITY IN ADULT, UNSPECIFIED BMI: ICD-10-CM

## 2025-04-05 DIAGNOSIS — F41.9 ANXIETY: ICD-10-CM

## 2025-04-07 RX ORDER — METOPROLOL SUCCINATE 25 MG/1
25 TABLET, EXTENDED RELEASE ORAL DAILY
Qty: 90 TABLET | Refills: 3 | Status: SHIPPED | OUTPATIENT
Start: 2025-04-07

## 2025-04-07 RX ORDER — SERTRALINE HYDROCHLORIDE 25 MG/1
25 TABLET, FILM COATED ORAL DAILY
Qty: 30 TABLET | Refills: 2 | OUTPATIENT
Start: 2025-04-07

## 2025-05-08 ENCOUNTER — TELEPHONE (OUTPATIENT)
Dept: INTERNAL MEDICINE CLINIC | Age: 65
End: 2025-05-08

## 2025-05-08 NOTE — TELEPHONE ENCOUNTER
Attempted to call patient 3x to establish care with new provider, due to Dr. Gaffney no longer being with [facility].    Dates called:  4/29/25 5/1/25 5/8/25

## 2025-05-15 ENCOUNTER — TELEPHONE (OUTPATIENT)
Dept: ADMINISTRATIVE | Age: 65
End: 2025-05-15

## 2025-05-15 NOTE — TELEPHONE ENCOUNTER
Submitted PA for Repatha SureClick 140MG/ML auto-injectors   Via CMM Key: [id number]  STATUS: PENDING.    Follow up done daily; if no decision with in three days we will refax.  If another three days goes by with no decision will call the insurance for status.

## 2025-05-19 NOTE — TELEPHONE ENCOUNTER
The medication repatha is APPROVED from 5/17/25 to 5/17/26.    Please notify the patient.    If this requires a response please respond to the pool ( P [facility] MEDICATION PRE-AUTH).

## 2025-05-21 RX ORDER — EVOLOCUMAB 140 MG/ML
140 INJECTION, SOLUTION SUBCUTANEOUS
Qty: 6 EACH | Refills: 1 | Status: SHIPPED | OUTPATIENT
Start: 2025-05-21

## 2025-05-21 NOTE — TELEPHONE ENCOUNTER
Resent to [facility] to see if they can help with programs for patient. Patient is aware.

## 2025-05-22 ENCOUNTER — HOSPITAL ENCOUNTER (OUTPATIENT)
Dept: CT IMAGING | Age: 65
Discharge: HOME | End: 2025-05-22
Attending: INTERNAL MEDICINE
Payer: COMMERCIAL

## 2025-05-22 DIAGNOSIS — I71.21 ANEURYSM OF ASCENDING AORTA WITHOUT RUPTURE: ICD-10-CM

## 2025-05-22 PROCEDURE — 71250 CT THORAX DX C-: CPT

## 2025-05-23 ENCOUNTER — RESULTS FOLLOW-UP (OUTPATIENT)
Dept: CARDIOLOGY CLINIC | Age: 65
End: 2025-05-23

## 2025-05-23 NOTE — TELEPHONE ENCOUNTER
----- Message from Dr. Stef Chapman MD sent at 5/23/2025  2:04 PM EDT -----  Stable 3.9 cm aneurysmal dilatation of the ascending thoracic aorta.     No other new acute findings in the chest.    Reassuring study.  Follow-up CTA likely in 1 year would be sufficient for continued surveillance.  Will follow-up with [facility] as planned

## 2025-06-02 NOTE — TELEPHONE ENCOUNTER
Received faxed from [facility] faxed over approval for repatha, they are out of network so sending script to [facility] for repatha per pt request. Rx pending paper scanned into media

## 2025-06-16 ENCOUNTER — PATIENT MESSAGE (OUTPATIENT)
Dept: CARDIOLOGY CLINIC | Age: 65
End: 2025-06-16

## 2025-06-16 DIAGNOSIS — E78.00 HYPERCHOLESTEREMIA: Primary | ICD-10-CM

## 2025-06-17 NOTE — TELEPHONE ENCOUNTER
SOSA ALBARRAN- pt is prescribed Repatha, she cannot meet her deductible at this time due to cost so the medication is unaffordable. Please advise  Patient assistance is not an option as this medication is covered once pt reaches her deductible.     Lab Results   Component Value Date    CHOL 229 (H) 01/03/2025    TRIG 149 01/03/2025    HDL 51 01/03/2025     (H) 01/03/2025    VLDL 30 01/03/2025

## 2025-06-18 RX ORDER — EZETIMIBE 10 MG/1
10 TABLET ORAL DAILY
Qty: 90 TABLET | Refills: 1 | Status: SHIPPED | OUTPATIENT
Start: 2025-06-18

## 2025-06-18 NOTE — TELEPHONE ENCOUNTER
Understood.  Lets start Zetia 10 mg once daily.  This was suggested as alternative by [facility] per documentation previously.  Medication is well-tolerated and does not cause myalgias like statins that she has previously dealt with.  Recheck lipids 3 months from start.

## 2025-07-02 ENCOUNTER — RESULTS FOLLOW-UP (OUTPATIENT)
Dept: OBGYN CLINIC | Age: 65
End: 2025-07-02

## 2025-07-02 ENCOUNTER — HOSPITAL ENCOUNTER (OUTPATIENT)
Dept: WOMENS IMAGING | Age: 65
Discharge: HOME | End: 2025-07-02
Payer: COMMERCIAL

## 2025-07-02 VITALS — HEIGHT: 64 IN | WEIGHT: 180 LBS | BODY MASS INDEX: 30.73 KG/M2

## 2025-07-02 DIAGNOSIS — Z12.31 ENCOUNTER FOR SCREENING MAMMOGRAM FOR MALIGNANT NEOPLASM OF BREAST: ICD-10-CM

## 2025-07-02 PROCEDURE — 77063 BREAST TOMOSYNTHESIS BI: CPT

## 2025-08-04 DIAGNOSIS — Z78.9 STATIN INTOLERANCE: ICD-10-CM

## 2025-08-04 DIAGNOSIS — E78.00 PURE HYPERCHOLESTEROLEMIA: Primary | ICD-10-CM
